# Patient Record
Sex: MALE | Race: WHITE | Employment: STUDENT | URBAN - METROPOLITAN AREA
[De-identification: names, ages, dates, MRNs, and addresses within clinical notes are randomized per-mention and may not be internally consistent; named-entity substitution may affect disease eponyms.]

---

## 2017-05-15 ENCOUNTER — OFFICE VISIT (OUTPATIENT)
Dept: FAMILY MEDICINE | Facility: CLINIC | Age: 22
End: 2017-05-15
Payer: COMMERCIAL

## 2017-05-15 ENCOUNTER — RADIANT APPOINTMENT (OUTPATIENT)
Dept: GENERAL RADIOLOGY | Facility: CLINIC | Age: 22
End: 2017-05-15
Attending: NURSE PRACTITIONER
Payer: COMMERCIAL

## 2017-05-15 VITALS
WEIGHT: 196 LBS | SYSTOLIC BLOOD PRESSURE: 128 MMHG | HEART RATE: 90 BPM | DIASTOLIC BLOOD PRESSURE: 82 MMHG | TEMPERATURE: 98.2 F | HEIGHT: 73 IN | OXYGEN SATURATION: 97 % | BODY MASS INDEX: 25.98 KG/M2

## 2017-05-15 DIAGNOSIS — S62.306A CLOSED NONDISPLACED FRACTURE OF FIFTH METACARPAL BONE OF RIGHT HAND, UNSPECIFIED PORTION OF METACARPAL, INITIAL ENCOUNTER: Primary | ICD-10-CM

## 2017-05-15 DIAGNOSIS — M79.89 SWELLING OF RIGHT HAND: ICD-10-CM

## 2017-05-15 DIAGNOSIS — F41.0 PANIC ATTACK: ICD-10-CM

## 2017-05-15 PROCEDURE — 99214 OFFICE O/P EST MOD 30 MIN: CPT | Performed by: NURSE PRACTITIONER

## 2017-05-15 PROCEDURE — 73130 X-RAY EXAM OF HAND: CPT | Mod: RT

## 2017-05-15 RX ORDER — CLONAZEPAM 0.5 MG/1
0.25-0.5 TABLET ORAL 2 TIMES DAILY PRN
Qty: 20 TABLET | Refills: 0 | Status: SHIPPED | OUTPATIENT
Start: 2017-05-15 | End: 2018-03-28

## 2017-05-15 ASSESSMENT — PATIENT HEALTH QUESTIONNAIRE - PHQ9: 5. POOR APPETITE OR OVEREATING: NEARLY EVERY DAY

## 2017-05-15 ASSESSMENT — ANXIETY QUESTIONNAIRES
6. BECOMING EASILY ANNOYED OR IRRITABLE: NOT AT ALL
2. NOT BEING ABLE TO STOP OR CONTROL WORRYING: NEARLY EVERY DAY
7. FEELING AFRAID AS IF SOMETHING AWFUL MIGHT HAPPEN: SEVERAL DAYS
GAD7 TOTAL SCORE: 14
1. FEELING NERVOUS, ANXIOUS, OR ON EDGE: NEARLY EVERY DAY
IF YOU CHECKED OFF ANY PROBLEMS ON THIS QUESTIONNAIRE, HOW DIFFICULT HAVE THESE PROBLEMS MADE IT FOR YOU TO DO YOUR WORK, TAKE CARE OF THINGS AT HOME, OR GET ALONG WITH OTHER PEOPLE: VERY DIFFICULT
5. BEING SO RESTLESS THAT IT IS HARD TO SIT STILL: SEVERAL DAYS
3. WORRYING TOO MUCH ABOUT DIFFERENT THINGS: NEARLY EVERY DAY

## 2017-05-15 NOTE — PATIENT INSTRUCTIONS
Anxiety Reaction  Anxiety is the feeling we all get when we think something bad might happen. It is a normal response to stress and usually causes only a mild reaction. When anxiety becomes more severe, it can interfere with daily life. In some cases, you may not even be aware of what it is you re anxious about. There may also be a genetic link or it may be a learned behavior in the home.  Both psychological and physical triggers cause stress reaction. It's often a response to fear or emotional stress, real or imagined. This stress may come from home, family, work, or social relationships.  During an anxiety reaction, you may feel:    Helpless    Nervous    Depressed    Irritable  Your body may show signs of anxiety in many ways. You may experience:    Dry mouth    Shakiness    Dizziness    Weakness    Trouble breathing    Breathing fast (hyperventilating)    Chest pressure    Sweating    Headache    Nausea    Diarrhea    Tiredness    Inability to sleep    Sexual problems  Home care    Try to locate the sources of stress in your life. They may not be obvious. These may include:    Daily hassles of life (traffic jams, missed appointments, car troubles, etc.)    Major life changes, both good (new baby, job promotion) and bad (loss of job, loss of loved one)    Overload: feeling that you have too many responsibilities and can't take care of all of them at once    Feeling helpless, feeling that your problems are beyond what you re able to solve    Notice how your body reacts to stress. Learn to listen to your body signals. This will help you take action before the stress becomes severe.    When you can, do something about the source of your stress. (Avoid hassles, limit the amount of change that happens in your life at one time and take a break when you feel overloaded).    Unfortunately, many stressful situations can't be avoided. It is necessary to learn how to better manage stress. There are many proven methods  that will reduce your anxiety. These include simple things like exercise, good nutrition and adequate rest. Also, there are certain techniques that are helpful:    Relaxation    Breathing exercises    Visualization    Biofeedback    Meditation  For more information about this, consult your doctor or go to a local bookstore and review the many books and tapes available on this subject.  Follow-up care  If you feel that your anxiety is not responding to self-help measures, contact your doctor or make an appointment with a counselor. You may need short-term psychological counseling and temporary medicine to help you manage stress.  Call 911  Call your healthcare provider right away if any of these occur:    Trouble breathing    Confusion    Drowsiness or trouble wakening    Fainting or loss of consciousness    Rapid heart rate    Seizure    New chest pain that becomes more severe, lasts longer, or spreads into your shoulder, arm, neck, jaw, or back  When to seek medical advice  Call your healthcare provider right away if any of these occur:    Your symptoms get worse    Severe headache not relieved by rest and mild pain reliever    0850-3541 [employer]. [address] [place] [address]. All rights reserved. This information is not intended as a substitute for professional medical care. Always follow your healthcare professional's instructions.        Boxer s Fracture  A boxer s fracture is a break in a bone in outer edge of the hand. The bone is under the pinky finger bones. Boxer s fracture gets its name because it is often caused by punching a hard surface with the fist.  Understanding the bones of the hand  The bones of your hand are called metacarpal bones. They connect the bones of your fingers (phalanges) to the bones of your wrist (carpals). The fifth metacarpal is the metacarpal of the fifth finger (pinky). A metacarpal bone has a long section of the bone (shaft) connected to the end of  the bone. The area where the shaft connects to the end of the bone is called the neck. The neck is the weakest point of the bone. This is where a boxer s fracture happens.  What causes boxer s fracture?  You may have a boxer s fracture from an injury. This most often happens from punching a hard object, such as a punching bag, wall, or other firm surface. You may also get a boxer s fracture if you fall on your closed fist.  Symptoms of boxer s fracture  Symptoms of a boxer s fracture can include:    Painful bruising and swelling of the hand    Bent, claw-like pinky finger that is out of its normal position    Limited movement (range of motion) of the ring (fourth) and pinky fingers    Change in the shape of the knuckle  Diagnosing boxer s fracture  Your health care provider will ask about your symptoms and about how you injured the hand. He or she will also examine your hand. You may have an X-ray of the hand. This uses a small amount of radiation to create an image of the bones.  Treatment for boxer s fracture  Your health care provider may refer you to a hand orthopedist. This is a doctor who treats hand problems. Your treatment may first include:    Cleaning any cuts    A tetanus vaccine, if you have cuts    Resting your hand and keeping it raised (elevated) as much as possible for a few days    Putting ice on it throughout the day    Taking prescription or over-the-counter pain medicine    Wearing a splint for several weeks  You may need to have your bones put back into position. You may have a local pain medicine to keep you from feeling pain during this process. Your health care provider will then move the bones back into place.  Surgery for boxer s fracture  You made need surgery. This is done by a hand surgeon. The surgeon makes a cut (incision) in the skin in order to put your bones back into place. You may need surgery if:    The bone has broken through the skin    The bone is broken in several  places    You use your hands and fingers for your work. For example, if you are a musician, craftsman, or .    Your hand doesn t heal normally  After surgery, you will have physical therapy to help you heal. The therapy includes treatments and exercises.  What happens if you don t get treated?  An untreated boxer s fracture can cause problems such as:    You may be less able to  objects.    You may not be able to move your hand or finger as much as you did before the injury.    Your finger may not look normal.  Preventing boxer s fracture  If you box, make sure you use the correct technique and the proper equipment.  How to manage boxer s fracture  Your health care provider may tell you to:    Keep your splint from getting wet.    Keep your bones strong and help your fracture heal. Eat foods with vitamin D, calcium, and protein.    Stop smoking. If you smoke, your fracture may not heal as quickly.    Be careful while your hand heals. You may need to use a brace for a while.     When to call the health care provider  Call your health care provider right away if you have any of these:    Numbness or tingling in your fingers    Fingers that look blue    Pain or swelling that gets worse    Problems with your splint    Skin in the area is that is red, painful, or swollen        7796-9611 [employer]. [address] [place] [address], [place] [address]. All rights reserved. This information is not intended as a substitute for professional medical care. Always follow your healthcare professional's instructions.

## 2017-05-15 NOTE — MR AVS SNAPSHOT
After Visit Summary   5/15/2017    Mario Raines    MRN: [id number]           Patient Information     Date Of Birth          1995        Visit Information        Provider Department      5/15/2017 3:20 PM Ana Langston NP [facility]        Today's Diagnoses     Swelling of right hand    -  1    Panic attack        Closed nondisplaced fracture of fifth metacarpal bone of right hand, unspecified portion of metacarpal, initial encounter          Care Instructions      Anxiety Reaction  Anxiety is the feeling we all get when we think something bad might happen. It is a normal response to stress and usually causes only a mild reaction. When anxiety becomes more severe, it can interfere with daily life. In some cases, you may not even be aware of what it is you re anxious about. There may also be a genetic link or it may be a learned behavior in the home.  Both psychological and physical triggers cause stress reaction. It's often a response to fear or emotional stress, real or imagined. This stress may come from home, family, work, or social relationships.  During an anxiety reaction, you may feel:    Helpless    Nervous    Depressed    Irritable  Your body may show signs of anxiety in many ways. You may experience:    Dry mouth    Shakiness    Dizziness    Weakness    Trouble breathing    Breathing fast (hyperventilating)    Chest pressure    Sweating    Headache    Nausea    Diarrhea    Tiredness    Inability to sleep    Sexual problems  Home care    Try to locate the sources of stress in your life. They may not be obvious. These may include:    Daily hassles of life (traffic jams, missed appointments, car troubles, etc.)    Major life changes, both good (new baby, job promotion) and bad (loss of job, loss of loved one)    Overload: feeling that you have too many responsibilities and can't take care of all of them at once    Feeling helpless, feeling that your problems are beyond what  you re able to solve    Notice how your body reacts to stress. Learn to listen to your body signals. This will help you take action before the stress becomes severe.    When you can, do something about the source of your stress. (Avoid hassles, limit the amount of change that happens in your life at one time and take a break when you feel overloaded).    Unfortunately, many stressful situations can't be avoided. It is necessary to learn how to better manage stress. There are many proven methods that will reduce your anxiety. These include simple things like exercise, good nutrition and adequate rest. Also, there are certain techniques that are helpful:    Relaxation    Breathing exercises    Visualization    Biofeedback    Meditation  For more information about this, consult your doctor or go to a local bookstore and review the many books and tapes available on this subject.  Follow-up care  If you feel that your anxiety is not responding to self-help measures, contact your doctor or make an appointment with a counselor. You may need short-term psychological counseling and temporary medicine to help you manage stress.  Call 911  Call your healthcare provider right away if any of these occur:    Trouble breathing    Confusion    Drowsiness or trouble wakening    Fainting or loss of consciousness    Rapid heart rate    Seizure    New chest pain that becomes more severe, lasts longer, or spreads into your shoulder, arm, neck, jaw, or back  When to seek medical advice  Call your healthcare provider right away if any of these occur:    Your symptoms get worse    Severe headache not relieved by rest and mild pain reliever    0142-3715 [employer]. [address] [place] [address], [place] [id number]. All rights reserved. This information is not intended as a substitute for professional medical care. Always follow your healthcare professional's instructions.        Boxer s Fracture  A boxer s fracture is a break in a  bone in outer edge of the hand. The bone is under the pinky finger bones. Boxer s fracture gets its name because it is often caused by punching a hard surface with the fist.  Understanding the bones of the hand  The bones of your hand are called metacarpal bones. They connect the bones of your fingers (phalanges) to the bones of your wrist (carpals). The fifth metacarpal is the metacarpal of the fifth finger (pinky). A metacarpal bone has a long section of the bone (shaft) connected to the end of the bone. The area where the shaft connects to the end of the bone is called the neck. The neck is the weakest point of the bone. This is where a boxer s fracture happens.  What causes boxer s fracture?  You may have a boxer s fracture from an injury. This most often happens from punching a hard object, such as a punching bag, wall, or other firm surface. You may also get a boxer s fracture if you fall on your closed fist.  Symptoms of boxer s fracture  Symptoms of a boxer s fracture can include:    Painful bruising and swelling of the hand    Bent, claw-like pinky finger that is out of its normal position    Limited movement (range of motion) of the ring (fourth) and pinky fingers    Change in the shape of the knuckle  Diagnosing boxer s fracture  Your health care provider will ask about your symptoms and about how you injured the hand. He or she will also examine your hand. You may have an X-ray of the hand. This uses a small amount of radiation to create an image of the bones.  Treatment for boxer s fracture  Your health care provider may refer you to a hand orthopedist. This is a doctor who treats hand problems. Your treatment may first include:    Cleaning any cuts    A tetanus vaccine, if you have cuts    Resting your hand and keeping it raised (elevated) as much as possible for a few days    Putting ice on it throughout the day    Taking prescription or over-the-counter pain medicine    Wearing a splint for several  weeks  You may need to have your bones put back into position. You may have a local pain medicine to keep you from feeling pain during this process. Your health care provider will then move the bones back into place.  Surgery for boxer s fracture  You made need surgery. This is done by a hand surgeon. The surgeon makes a cut (incision) in the skin in order to put your bones back into place. You may need surgery if:    The bone has broken through the skin    The bone is broken in several places    You use your hands and fingers for your work. For example, if you are a musician, craftsman, or .    Your hand doesn t heal normally  After surgery, you will have physical therapy to help you heal. The therapy includes treatments and exercises.  What happens if you don t get treated?  An untreated boxer s fracture can cause problems such as:    You may be less able to  objects.    You may not be able to move your hand or finger as much as you did before the injury.    Your finger may not look normal.  Preventing boxer s fracture  If you box, make sure you use the correct technique and the proper equipment.  How to manage boxer s fracture  Your health care provider may tell you to:    Keep your splint from getting wet.    Keep your bones strong and help your fracture heal. Eat foods with vitamin D, calcium, and protein.    Stop smoking. If you smoke, your fracture may not heal as quickly.    Be careful while your hand heals. You may need to use a brace for a while.     When to call the health care provider  Call your health care provider right away if you have any of these:    Numbness or tingling in your fingers    Fingers that look blue    Pain or swelling that gets worse    Problems with your splint    Skin in the area is that is red, painful, or swollen        2888-0783 [employer]. [address] [place] [address], [place] [address]. All rights reserved. This information is not intended as a substitute for  professional medical care. Always follow your healthcare professional's instructions.              Follow-ups after your visit        Additional Services     ORTHOPEDICS ADULT REFERRAL       Your provider has referred you to: [facility] [phone]   [email or web address]  [facility]: [facility] [phone]   [email or web address]  [facility]: [facility] [place] [phone]   [email or web address]  [facility]: [facility] [place] [phone]   [email or web address]/  [facility]: [facility] [place] [phone]   [email or web address]    Please be aware that coverage of these services is subject to the terms and limitations of your health insurance plan.  Call member services at your health plan with any benefit or coverage questions.      Please bring the following to your appointment:    >>   Any x-rays, CTs or MRIs which have been performed.  Contact the facility where they were done to arrange for  prior to your scheduled appointment.    >>   List of current medications   >>   This referral request   >>   Any documents/labs given to you for this referral                  Follow-up notes from your care team     Return if symptoms worsen or fail to improve.      Who to contact     Normal or non-critical lab and imaging results will be communicated to you by MyChart, letter or phone within 4 business days after the clinic has received the results. If you do not hear from us within 7 days, please contact the clinic through MyChart or phone. If you have a critical or abnormal lab result, we will notify you by phone as soon as possible.  Submit refill requests through [facility] or call your pharmacy and they will  "forward the refill request to us. Please allow 3 business days for your refill to be completed.          If you need to speak with a  for additional information , please call: [phone]             Additional Information About Your Visit        [facility] Information     [facility] gives you secure access to your electronic health record. If you see a primary care provider, you can also send messages to your care team and make appointments. If you have questions, please call your primary care clinic.  If you do not have a primary care provider, please call [phone] and they will assist you.        Care EveryWhere ID     This is your Care EveryWhere ID. This could be used by other organizations to access your [facility] medical records  [id number]        Your Vitals Were     Pulse Temperature Height Pulse Oximetry BMI (Body Mass Index)       90 98.2  F (36.8  C) (Oral) 6' 0.64\" (1.845 m) 97% 26.12 kg/m2        Blood Pressure from Last 3 Encounters:   05/15/17 156/78   10/23/15 122/80   10/05/15 123/70    Weight from Last 3 Encounters:   05/15/17 196 lb (88.9 kg)   10/23/15 247 lb 12.8 oz (112.4 kg) (>99 %)*   10/05/15 245 lb (111.1 kg) (>99 %)*     * Growth percentiles are based on [facility] 2-20 Years data.              We Performed the Following     DEPRESSION ACTION PLAN (DAP)     ORTHOPEDICS ADULT REFERRAL          Today's Medication Changes          These changes are accurate as of: 5/15/17  3:54 PM.  If you have any questions, ask your nurse or doctor.               Start taking these medicines.        Dose/Directions    clonazePAM 0.5 MG tablet   Commonly known as:  klonoPIN   Used for:  Panic attack   Started by:  Ana Langston NP        Dose:  0.25-0.5 mg   Take 0.5-1 tablets (0.25-0.5 mg) by mouth 2 times daily as needed for other (panic attack)   Quantity:  20 tablet   Refills:  0            Where to get your medicines      Some of these will need a paper prescription and others can be " bought over the counter.  Ask your nurse if you have questions.     Bring a paper prescription for each of these medications     clonazePAM 0.5 MG tablet                Primary Care Provider Office Phone #    Merlin Jarrett [facility] [phone]       No address on file        Thank you!     Thank you for choosing [facility]  for your care. Our goal is always to provide you with excellent care. Hearing back from our patients is one way we can continue to improve our services. Please take a few minutes to complete the written survey that you may receive in the mail after your visit with us. Thank you!             Your Updated Medication List - Protect others around you: Learn how to safely use, store and throw away your medicines at www.disposemymeds.org.          This list is accurate as of: 5/15/17  3:54 PM.  Always use your most recent med list.                   Brand Name Dispense Instructions for use    clonazePAM 0.5 MG tablet    klonoPIN    20 tablet    Take 0.5-1 tablets (0.25-0.5 mg) by mouth 2 times daily as needed for other (panic attack)

## 2017-05-15 NOTE — LETTER
My Depression Action Plan  Name: Mario Raines   Date of Birth 1995  Date: 5/15/2017    My doctor: Merlin Wood   My clinic: [facility] [place]  [address]  [place] [id number] [phone]          GREEN    ZONE   Good Control    What it looks like:     Things are going generally well. You have normal up s and down s. You may even feel depressed from time to time, but bad moods usually last less than a day.   What you need to do:  1. Continue to care for yourself (see self care plan)  2. Check your depression survival kit and update it as needed  3. Follow your physician s recommendations including any medication.  4. Do not stop taking medication unless you consult with your physician first.           YELLOW         ZONE Getting Worse    What it looks like:     Depression is starting to interfere with your life.     It may be hard to get out of bed; you may be starting to isolate yourself from others.    Symptoms of depression are starting to last most all day and this has happened for several days.     You may have suicidal thoughts but they are not constant.   What you need to do:     1. Call your care team, your response to treatment will improve if you keep your care team informed of your progress. Yellow periods are signs an adjustment may need to be made.     2. Continue your self-care, even if you have to fake it!    3. Talk to someone in your support network    4. Open up your depression survival kit           RED    ZONE Medical Alert - Get Help    What it looks like:     Depression is seriously interfering with your life.     You may experience these or other symptoms: You can t get out of bed most days, can t work or engage in other necessary activities, you have trouble taking care of basic hygiene, or basic responsibilities, thoughts of suicide or death that will not go away, self-injurious behavior.     What you need to do:  1. Call your care  team and request a same-day appointment. If they are not available (weekends or after hours) call your local crisis line, emergency room or 911.      Electronically signed by: Sharda Bui, May 15, 2017    Depression Self Care Plan / Survival Kit    Self-Care for Depression  Here s the deal. Your body and mind are really not as separate as most people think.  What you do and think affects how you feel and how you feel influences what you do and think. This means if you do things that people who feel good do, it will help you feel better.  Sometimes this is all it takes.  There is also a place for medication and therapy depending on how severe your depression is, so be sure to consult with your medical provider and/ or Behavioral Health Consultant if your symptoms are worsening or not improving.     In order to better manage my stress, I will:    Exercise  Get some form of exercise, every day. This will help reduce pain and release endorphins, the  feel good  chemicals in your brain. This is almost as good as taking antidepressants!  This is not the same as joining a gym and then never going! (they count on that by the way ) It can be as simple as just going for a walk or doing some gardening, anything that will get you moving.      Hygiene   Maintain good hygiene (Get out of bed in the morning, Make your bed, Brush your teeth, Take a shower, and Get dressed like you were going to work, even if you are unemployed).  If your clothes don't fit try to get ones that do.    Diet  I will strive to eat foods that are good for me, drink plenty of water, and avoid excessive sugar, caffeine, alcohol, and other mood-altering substances.  Some foods that are helpful in depression are: complex carbohydrates, B vitamins, flaxseed, fish or fish oil, fresh fruits and vegetables.    Psychotherapy  I agree to participate in Individual Therapy (if recommended).    Medication  If prescribed medications, I agree to take them.   Missing doses can result in serious side effects.  I understand that drinking alcohol, or other illicit drug use, may cause potential side effects.  I will not stop my medication abruptly without first discussing it with my provider.    Staying Connected With Others  I will stay in touch with my friends, family members, and my primary care provider/team.    Use your imagination  Be creative.  We all have a creative side; it doesn t matter if it s oil painting, sand castles, or mud pies! This will also kick up the endorphins.    Witness Beauty  (AKA stop and smell the roses) Take a look outside, even in mid-winter. Notice colors, textures. Watch the squirrels and birds.     Service to others  Be of service to others.  There is always someone else in need.  By helping others we can  get out of ourselves  and remember the really important things.  This also provides opportunities for practicing all the other parts of the program.    Humor  Laugh and be silly!  Adjust your TV habits for less news and crime-drama and more comedy.    Control your stress  Try breathing deep, massage therapy, biofeedback, and meditation. Find time to relax each day.     My support system    Clinic Contact:  Phone number:    Contact 1:  Phone number:    Contact 2:  Phone number:    [facility]/:  Phone number:    Therapist:  Phone number:    Local crisis center:    Phone number:    Other community support:  Phone number:

## 2017-05-15 NOTE — NURSING NOTE
"Chief Complaint   Patient presents with     Fracture       Initial /78  Pulse 90  Temp 98.2  F (36.8  C) (Oral)  Ht 6' 0.64\" (1.845 m)  Wt 196 lb (88.9 kg)  SpO2 97%  BMI 26.12 kg/m2 Estimated body mass index is 26.12 kg/(m^2) as calculated from the following:    Height as of this encounter: 6' 0.64\" (1.845 m).    Weight as of this encounter: 196 lb (88.9 kg).  Medication Reconciliation: complete     Sharda Bui MA  "

## 2017-05-15 NOTE — PROGRESS NOTES
SUBJECTIVE:                                                    Mario Raines is a 21 year old male who presents to clinic today for the following health issues:      Muscle/Joint Pain     Onset: today- punched a wall when having a panic attack     Description:   Location: right pinkie finger  Character: ache at the time    Progression of Symptoms: better    Accompanying Signs & Symptoms:  Other symptoms: feels out of place, swollen    History:   Previous similar pain: no       Precipitating factors:   Trauma or overuse: YES- punched a wall    Alleviating factors:  Improved by: nothing  Therapies Tried and outcome: ice     Anxiety- hx of anxiety/ insomnia/ panic attacks. Has been on medication in the past.     Problem list and histories reviewed & adjusted, as indicated.  Additional history: as documented    Patient Active Problem List   Diagnosis     Eczema     Acne     Fifth Metatarsal Bone Styloid/avulsion fracture     Ankle sprain     Deliberate self-cutting     Insomnia     Health Care Home     24 hour contact given to patients monther     Moderate major depression (H)     Common wart     JIMBO (obstructive sleep apnea)     Panic attack     Past Surgical History:   Procedure Laterality Date     NO HISTORY OF SURGERY         Social History   Substance Use Topics     Smoking status: Former Smoker     Types: Cigarettes     Smokeless tobacco: Never Used     Alcohol use No     Family History   Problem Relation Age of Onset     Hypertension Maternal Grandmother      Hypertension Maternal Grandfather      Family History Negative Mother      Family History Negative Father      Family History Negative Paternal Grandmother      HEART DISEASE Paternal Grandfather      heart disease     DIABETES No family hx of      CEREBROVASCULAR DISEASE No family hx of      Breast Cancer No family hx of      Cancer - colorectal No family hx of      Prostate Cancer No family hx of      C.A.D. No family hx of          Current Outpatient  "Prescriptions   Medication Sig Dispense Refill     clonazePAM (KLONOPIN) 0.5 MG tablet Take 0.5-1 tablets (0.25-0.5 mg) by mouth 2 times daily as needed for other (panic attack) 20 tablet 0     No Known Allergies  BP Readings from Last 3 Encounters:   05/15/17 128/82   10/23/15 122/80   10/05/15 123/70    Wt Readings from Last 3 Encounters:   05/15/17 196 lb (88.9 kg)   10/23/15 247 lb 12.8 oz (112.4 kg) (>99 %)*   10/05/15 245 lb (111.1 kg) (>99 %)*     * Growth percentiles are based on CDC 2-20 Years data.              Labs reviewed in EPIC    Reviewed and updated as needed this visit by clinical staff  Tobacco  Allergies  Meds  Med Hx  Surg Hx  Fam Hx  Soc Hx      Reviewed and updated as needed this visit by Provider         ROS:  Constitutional, HEENT, cardiovascular, pulmonary, GI, , musculoskeletal, neuro, skin, endocrine and psych systems are negative, except as otherwise noted.    OBJECTIVE:                                                    /82  Pulse 90  Temp 98.2  F (36.8  C) (Oral)  Ht 6' 0.64\" (1.845 m)  Wt 196 lb (88.9 kg)  SpO2 97%  BMI 26.12 kg/m2  Body mass index is 26.12 kg/(m^2).  GENERAL: healthy, alert and no distress  RESP: lungs clear to auscultation - no rales, rhonchi or wheezes  CV: regular rate and rhythm, normal S1 S2, no S3 or S4, no murmur, click or rub, no peripheral edema and peripheral pulses strong  MS: no gross musculoskeletal defects noted POSITIVE- slight edema of dorsal R hand  SKIN: no suspicious lesions or rashes  NEURO: Normal strength and tone, mentation intact and speech normal  PSYCH: mentation appears slightly anxious, affect normal/bright    Diagnostic Test Results:  See orders     ASSESSMENT/PLAN:                                                          ICD-10-CM    1. Closed nondisplaced fracture of fifth metacarpal bone of right hand, unspecified portion of metacarpal, initial encounter S62.306A ORTHOPEDICS ADULT REFERRAL   2. Swelling of right " hand M79.89 XR Hand Right G/E 3 Views   3. Panic attack F41.0 clonazePAM (KLONOPIN) 0.5 MG tablet       See Patient Instructions    SIDDHARTH Deal  [facility]

## 2017-05-16 ASSESSMENT — ANXIETY QUESTIONNAIRES: GAD7 TOTAL SCORE: 14

## 2017-05-16 ASSESSMENT — PATIENT HEALTH QUESTIONNAIRE - PHQ9: SUM OF ALL RESPONSES TO PHQ QUESTIONS 1-9: 13

## 2017-06-01 ENCOUNTER — RADIANT APPOINTMENT (OUTPATIENT)
Dept: GENERAL RADIOLOGY | Facility: CLINIC | Age: 22
End: 2017-06-01
Attending: ORTHOPAEDIC SURGERY
Payer: COMMERCIAL

## 2017-06-01 ENCOUNTER — OFFICE VISIT (OUTPATIENT)
Dept: ORTHOPEDICS | Facility: CLINIC | Age: 22
End: 2017-06-01
Payer: COMMERCIAL

## 2017-06-01 VITALS
WEIGHT: 192 LBS | HEIGHT: 72 IN | HEART RATE: 109 BPM | RESPIRATION RATE: 16 BRPM | OXYGEN SATURATION: 98 % | BODY MASS INDEX: 26.01 KG/M2

## 2017-06-01 DIAGNOSIS — S69.91XA HAND INJURY, RIGHT, INITIAL ENCOUNTER: Primary | ICD-10-CM

## 2017-06-01 DIAGNOSIS — S69.91XA HAND INJURY, RIGHT, INITIAL ENCOUNTER: ICD-10-CM

## 2017-06-01 DIAGNOSIS — S62.336A CLOSED DISPLACED FRACTURE OF NECK OF FIFTH METACARPAL BONE OF RIGHT HAND, INITIAL ENCOUNTER: ICD-10-CM

## 2017-06-01 PROCEDURE — 26605 TREAT METACARPAL FRACTURE: CPT | Mod: F9 | Performed by: ORTHOPAEDIC SURGERY

## 2017-06-01 PROCEDURE — 73130 X-RAY EXAM OF HAND: CPT | Mod: RT

## 2017-06-01 PROCEDURE — 73130 X-RAY EXAM OF HAND: CPT | Mod: 76

## 2017-06-01 ASSESSMENT — PAIN SCALES - GENERAL: PAINLEVEL: NO PAIN (0)

## 2017-06-01 NOTE — LETTER
6/1/2017       RE: Mario Raines  [address] [address]           Dear Colleague,    Thank you for referring your patient, Mario Raines, to the [facility]. Please see a copy of my visit note below.    SUBJECTIVE:  Mario Raines is a 21 year old male who sustained a right hand injury 5/15/17 . Mechanism of injury: he punched a wall. Immediate symptoms: immediate pain, immediate swelling. Symptoms have been improving since that time. Prior history of related problems: he had prior right 5th metacarpal shaft fracture about 3 years ago.  X-ray now shows right 5th metacarpal neck fracture.  He is in a lumafoam splint..    Past Medical History:   Diagnosis Date     Depression      NO ACTIVE PROBLEMS      Self mutilation        Past Surgical History:   Procedure Laterality Date     NO HISTORY OF SURGERY         Family History   Problem Relation Age of Onset     Hypertension Maternal Grandmother      Hypertension Maternal Grandfather      Family History Negative Mother      Family History Negative Father      Family History Negative Paternal Grandmother      HEART DISEASE Paternal Grandfather      heart disease     DIABETES No family hx of      CEREBROVASCULAR DISEASE No family hx of      Breast Cancer No family hx of      Cancer - colorectal No family hx of      Prostate Cancer No family hx of      C.A.D. No family hx of        Social History     Social History     Marital status: Single     Spouse name: N/A     Number of children: N/A     Years of education: N/A     Occupational History     Not on file.     Social History Main Topics     Smoking status: Former Smoker     Types: Cigarettes     Smokeless tobacco: Never Used     Alcohol use No     Drug use: No      Comment: past-marijuana and meth     Sexual activity: Yes     Partners: Female      Comment: girlfriend on OCP     Other Topics Concern     Not on file     Social History Narrative       Current Outpatient Prescriptions   Medication  Sig Dispense Refill     clonazePAM (KLONOPIN) 0.5 MG tablet Take 0.5-1 tablets (0.25-0.5 mg) by mouth 2 times daily as needed for other (panic attack) 20 tablet 0       No Known Allergies    REVIEW OF SYSTEMS:  CONSTITUTIONAL:  NEGATIVE for fever, chills, change in weight, not feeling tired  SKIN:  NEGATIVE for worrisome rashes, no skin lumps, no skin ulcers and no non-healing wounds  EYES:  NEGATIVE for vision changes or irritation.  ENT/MOUTH:  NEGATIVE.  No hearing loss, no hoarseness, no difficulty swallowing.  RESP:  NEGATIVE. No cough or shortness of breath.  BREAST:  NEGATIVE for masses, tenderness or discharge  CV:  NEGATIVE for chest pain, palpitations or peripheral edema  GI:  NEGATIVE for nausea, abdominal pain, heartburn, or change in bowel habits  :  Negative. No dysuria, no hematuria  MUSCULOSKELETAL:  See HPI above  NEURO:  NEGATIVE . No headaches, no dizziness,  no numbness  ENDOCRINE:  NEGATIVE for temperature intolerance, skin/hair changes  HEME/ALLERGY/IMMUNE:  NEGATIVE for bleeding problems  PSYCHIATRIC:  NEGATIVE. no anxiety, no depression.      Exam:  Vitals: Pulse 109  Resp 16  Ht 1.829 m (6')  Wt 87.1 kg (192 lb)  SpO2 98%  BMI 26.04 kg/m2  BMI= Body mass index is 26.04 kg/(m^2).  Constitutional:  healthy, alert and no distress  Neuro: Alert and Oriented x 3, Gait normal. Sensation grossly WNL.  Hand exam: reduced range of motion of right 5th metacarpal phalangeal joint.  There is a flexion deformity of 5th metacarpal shaft and distal.  Positive swelling about metacarpal phalangeal joint.  Sensation normal.    X-ray: fracture of right 5th metacarpal neck with flexion deformity.  Old flexion deformity of right 5th metacarpal shaft..    ASSESSMENT:  Right 5th metacarpal neck fracture.  He desires a closed reduction to improve position.    PLAN:  I performed injection with 1% lidocaine at fracture.  Closed reduction attempted very forcefully.  Splint applied.  X-ray shows slightly  improved position of fracture.  There is 8 degrees less angulation.  Continue splint.  Return to clinic 4-5 weeks with x-ray right hand out of splint.      Ramesh Mccabe M.D.  Department of Orthopaedic Surgery  [facility]    Again, thank you for allowing me to participate in the care of your patient.        Sincerely,              Ramesh Mccabe MD

## 2017-06-01 NOTE — MR AVS SNAPSHOT
After Visit Summary   6/1/2017    Mario Raines    MRN: [id number]           Patient Information     Date Of Birth          1995        Visit Information        Provider Department      6/1/2017 3:45 PM Ramesh Mccabe MD [facility]        Today's Diagnoses     Hand injury, right, initial encounter    -  1      Care Instructions    Use splint on hand.  Try to keep it dry.  retape if needed.  Return to clinic 5 weeks for x-ray.          Follow-ups after your visit        Who to contact     If you have questions or need follow up information about today's clinic visit or your schedule please contact [facility] directly at [phone].  Normal or non-critical lab and imaging results will be communicated to you by MyChart, letter or phone within 4 business days after the clinic has received the results. If you do not hear from us within 7 days, please contact the clinic through [employer] or phone. If you have a critical or abnormal lab result, we will notify you by phone as soon as possible.  Submit refill requests through [employer] or call your pharmacy and they will forward the refill request to us. Please allow 3 business days for your refill to be completed.          Additional Information About Your Visit        MyChart Information     [employer] gives you secure access to your electronic health record. If you see a primary care provider, you can also send messages to your care team and make appointments. If you have questions, please call your primary care clinic.  If you do not have a primary care provider, please call [phone] and they will assist you.        Care EveryWhere ID     This is your Care EveryWhere ID. This could be used by other organizations to access your [facility] medical records  [id number]        Your Vitals Were     Pulse Respirations Height Pulse Oximetry BMI (Body Mass Index)       109 16 1.829 m (6') 98% 26.04 kg/m2        Blood Pressure  from Last 3 Encounters:   05/15/17 128/82   10/23/15 122/80   10/05/15 123/70    Weight from Last 3 Encounters:   06/01/17 87.1 kg (192 lb)   05/15/17 88.9 kg (196 lb)   10/23/15 112.4 kg (247 lb 12.8 oz) (>99 %)*     * Growth percentiles are based on [facility] 2-20 Years data.               Primary Care Provider Office Phone #    [facility] [phone]       No address on file        Thank you!     Thank you for choosing [facility]  for your care. Our goal is always to provide you with excellent care. Hearing back from our patients is one way we can continue to improve our services. Please take a few minutes to complete the written survey that you may receive in the mail after your visit with us. Thank you!             Your Updated Medication List - Protect others around you: Learn how to safely use, store and throw away your medicines at www.disposemymeds.org.          This list is accurate as of: 6/1/17  5:10 PM.  Always use your most recent med list.                   Brand Name Dispense Instructions for use    clonazePAM 0.5 MG tablet    klonoPIN    20 tablet    Take 0.5-1 tablets (0.25-0.5 mg) by mouth 2 times daily as needed for other (panic attack)

## 2017-06-01 NOTE — NURSING NOTE
Chief Complaint   Patient presents with     Consult     patient punched a wall on 5/15/17 and went in and had xrays done that revealed a boxers fracture of the right hand       Initial Pulse 109  Resp 16  Ht 1.829 m (6')  Wt 87.1 kg (192 lb)  SpO2 98%  BMI 26.04 kg/m2 Estimated body mass index is 26.04 kg/(m^2) as calculated from the following:    Height as of this encounter: 1.829 m (6').    Weight as of this encounter: 87.1 kg (192 lb).  Medication Reconciliation: complete     Chapin Steward, CMA

## 2017-06-02 PROBLEM — S62.336A CLOSED DISPLACED FRACTURE OF NECK OF FIFTH METACARPAL BONE OF RIGHT HAND: Status: ACTIVE | Noted: 2017-06-02

## 2017-06-02 NOTE — PROGRESS NOTES
SUBJECTIVE:  Mario Raines is a 21 year old male who sustained a right hand injury 5/15/17 . Mechanism of injury: he punched a wall. Immediate symptoms: immediate pain, immediate swelling. Symptoms have been improving since that time. Prior history of related problems: he had prior right 5th metacarpal shaft fracture about 3 years ago.  X-ray now shows right 5th metacarpal neck fracture.  He is in a lumafoam splint..    Past Medical History:   Diagnosis Date     Depression      NO ACTIVE PROBLEMS      Self mutilation        Past Surgical History:   Procedure Laterality Date     NO HISTORY OF SURGERY         Family History   Problem Relation Age of Onset     Hypertension Maternal Grandmother      Hypertension Maternal Grandfather      Family History Negative Mother      Family History Negative Father      Family History Negative Paternal Grandmother      HEART DISEASE Paternal Grandfather      heart disease     DIABETES No family hx of      CEREBROVASCULAR DISEASE No family hx of      Breast Cancer No family hx of      Cancer - colorectal No family hx of      Prostate Cancer No family hx of      C.A.D. No family hx of        Social History     Social History     Marital status: Single     Spouse name: N/A     Number of children: N/A     Years of education: N/A     Occupational History     Not on file.     Social History Main Topics     Smoking status: Former Smoker     Types: Cigarettes     Smokeless tobacco: Never Used     Alcohol use No     Drug use: No      Comment: past-marijuana and meth     Sexual activity: Yes     Partners: Female      Comment: girlfriend on OCP     Other Topics Concern     Not on file     Social History Narrative       Current Outpatient Prescriptions   Medication Sig Dispense Refill     clonazePAM (KLONOPIN) 0.5 MG tablet Take 0.5-1 tablets (0.25-0.5 mg) by mouth 2 times daily as needed for other (panic attack) 20 tablet 0       No Known Allergies    REVIEW OF SYSTEMS:  CONSTITUTIONAL:   NEGATIVE for fever, chills, change in weight, not feeling tired  SKIN:  NEGATIVE for worrisome rashes, no skin lumps, no skin ulcers and no non-healing wounds  EYES:  NEGATIVE for vision changes or irritation.  ENT/MOUTH:  NEGATIVE.  No hearing loss, no hoarseness, no difficulty swallowing.  RESP:  NEGATIVE. No cough or shortness of breath.  BREAST:  NEGATIVE for masses, tenderness or discharge  CV:  NEGATIVE for chest pain, palpitations or peripheral edema  GI:  NEGATIVE for nausea, abdominal pain, heartburn, or change in bowel habits  :  Negative. No dysuria, no hematuria  MUSCULOSKELETAL:  See HPI above  NEURO:  NEGATIVE . No headaches, no dizziness,  no numbness  ENDOCRINE:  NEGATIVE for temperature intolerance, skin/hair changes  HEME/ALLERGY/IMMUNE:  NEGATIVE for bleeding problems  PSYCHIATRIC:  NEGATIVE. no anxiety, no depression.      Exam:  Vitals: Pulse 109  Resp 16  Ht 1.829 m (6')  Wt 87.1 kg (192 lb)  SpO2 98%  BMI 26.04 kg/m2  BMI= Body mass index is 26.04 kg/(m^2).  Constitutional:  healthy, alert and no distress  Neuro: Alert and Oriented x 3, Gait normal. Sensation grossly WNL.  Hand exam: reduced range of motion of right 5th metacarpal phalangeal joint.  There is a flexion deformity of 5th metacarpal shaft and distal.  Positive swelling about metacarpal phalangeal joint.  Sensation normal.    X-ray: fracture of right 5th metacarpal neck with flexion deformity.  Old flexion deformity of right 5th metacarpal shaft..    ASSESSMENT:  Right 5th metacarpal neck fracture.  He desires a closed reduction to improve position.    PLAN:  I performed injection with 1% lidocaine at fracture.  Closed reduction attempted very forcefully.  Splint applied.  X-ray shows slightly improved position of fracture.  There is 8 degrees less angulation.  Continue splint.  Return to clinic 4-5 weeks with x-ray right hand out of splint.      Ramesh Mccabe M.D.  Department of Orthopaedic Surgery  [facility]  Services

## 2017-07-06 ENCOUNTER — RADIANT APPOINTMENT (OUTPATIENT)
Dept: GENERAL RADIOLOGY | Facility: CLINIC | Age: 22
End: 2017-07-06
Attending: PHYSICIAN ASSISTANT
Payer: COMMERCIAL

## 2017-07-06 ENCOUNTER — OFFICE VISIT (OUTPATIENT)
Dept: ORTHOPEDICS | Facility: CLINIC | Age: 22
End: 2017-07-06
Payer: COMMERCIAL

## 2017-07-06 VITALS — HEIGHT: 72 IN | BODY MASS INDEX: 25.87 KG/M2 | WEIGHT: 191 LBS | RESPIRATION RATE: 18 BRPM | TEMPERATURE: 98.2 F

## 2017-07-06 DIAGNOSIS — S62.336D CLOSED DISPLACED FRACTURE OF NECK OF FIFTH METACARPAL BONE OF RIGHT HAND WITH ROUTINE HEALING, SUBSEQUENT ENCOUNTER: Primary | ICD-10-CM

## 2017-07-06 PROCEDURE — 99207 ZZC FRACTURE CARE IN GLOBAL PERIOD: CPT | Performed by: ORTHOPAEDIC SURGERY

## 2017-07-06 PROCEDURE — 73130 X-RAY EXAM OF HAND: CPT | Mod: RT

## 2017-07-06 NOTE — LETTER
7/6/2017         RE: Mario Raines  [address] [address]        Dear Colleague,    Thank you for referring your patient, Mario Raines, to the [facility]. Please see a copy of my visit note below.    Follow up right 5th metacarpal neck  fracture from 5/15/17.  Splint  is removed. He has been removing this at home as well.  Xray:  Compared to previous films there has not been significant interval changes in position; alignment remains acceptable. Progressive healing is seen.  The 5th metacarpal has increased curvature from this fracture and a previous fracture.  Exam shows no tenderness at 5th metacarpal.  He has full range of motion of the fingers.  He has been able to play the piano without problem.  Power  feels weird to him, but has good strength.    Assessment/Plan  Mario is doing well and will start range of motion on his own.  Resume activity as tolerated.  Return to clinic as needed.    Again, thank you for allowing me to participate in the care of your patient.        Sincerely,        Ramesh Mccabe MD

## 2017-07-06 NOTE — PROGRESS NOTES
Follow up right 5th metacarpal neck  fracture from 5/15/17.  Splint  is removed. He has been removing this at home as well.  Xray:  Compared to previous films there has not been significant interval changes in position; alignment remains acceptable. Progressive healing is seen.  The 5th metacarpal has increased curvature from this fracture and a previous fracture.  Exam shows no tenderness at 5th metacarpal.  He has full range of motion of the fingers.  He has been able to play the piano without problem.  Power  feels weird to him, but has good strength.    Assessment/Plan  Mario is doing well and will start range of motion on his own.  Resume activity as tolerated.  Return to clinic as needed.

## 2017-07-06 NOTE — NURSING NOTE
Chief Complaint   Patient presents with     RECHECK     Right 5th MC neck fx on 5/15/17.       Initial Temp 98.2  F (36.8  C)  Resp 18  Ht 1.829 m (6')  Wt 86.6 kg (191 lb)  BMI 25.9 kg/m2 Estimated body mass index is 25.9 kg/(m^2) as calculated from the following:    Height as of this encounter: 1.829 m (6').    Weight as of this encounter: 86.6 kg (191 lb).  Medication Reconciliation: complete   Lidia Woody MA

## 2017-07-06 NOTE — MR AVS SNAPSHOT
After Visit Summary   7/6/2017    Mario Raines    MRN: [id number]           Patient Information     Date Of Birth          1995        Visit Information        Provider Department      7/6/2017 3:15 PM Ramesh Mccabe MD [facility]        Today's Diagnoses     Closed displaced fracture of neck of fifth metacarpal bone of right hand with routine healing, subsequent encounter    -  1       Follow-ups after your visit        Who to contact     If you have questions or need follow up information about today's clinic visit or your schedule please contact [facility] directly at [phone].  Normal or non-critical lab and imaging results will be communicated to you by MyChart, letter or phone within 4 business days after the clinic has received the results. If you do not hear from us within 7 days, please contact the clinic through [employer] or phone. If you have a critical or abnormal lab result, we will notify you by phone as soon as possible.  Submit refill requests through [employer] or call your pharmacy and they will forward the refill request to us. Please allow 3 business days for your refill to be completed.          Additional Information About Your Visit        MyChart Information     [employer] gives you secure access to your electronic health record. If you see a primary care provider, you can also send messages to your care team and make appointments. If you have questions, please call your primary care clinic.  If you do not have a primary care provider, please call [phone] and they will assist you.        Care EveryWhere ID     This is your Care EveryWhere ID. This could be used by other organizations to access your [facility] medical records  [id number]        Your Vitals Were     Temperature Respirations Height BMI (Body Mass Index)          98.2  F (36.8  C) 18 1.829 m (6') 25.9 kg/m2         Blood Pressure from Last 3 Encounters:   05/15/17 128/82    10/23/15 122/80   10/05/15 123/70    Weight from Last 3 Encounters:   07/06/17 86.6 kg (191 lb)   06/01/17 87.1 kg (192 lb)   05/15/17 88.9 kg (196 lb)              We Performed the Following     X-ray rt Hand G/E 3 vws*        Primary Care Provider Office Phone #    [facility] [phone]       No address on file        Equal Access to Services     ROSLYN BURR : Hadii aad ku hadasho Soomaali, waaxda luqadaha, qaybta kaalmada adeegyada, waxay idiin hayaan adeeg ikedavidaye villafuerte . So [facility] [phone].    ATENCIÓN: Si yoly farfan, tiene a montoya disposición servicios gratuitos de asistencia lingüística. Llame al [phone].    We comply with applicable federal civil rights laws and [place] laws. We do not discriminate on the basis of race, color, national origin, age, disability sex, sexual orientation or gender identity.            Thank you!     Thank you for choosing [facility]  for your care. Our goal is always to provide you with excellent care. Hearing back from our patients is one way we can continue to improve our services. Please take a few minutes to complete the written survey that you may receive in the mail after your visit with us. Thank you!             Your Updated Medication List - Protect others around you: Learn how to safely use, store and throw away your medicines at www.disposemymeds.org.          This list is accurate as of: 7/6/17  3:52 PM.  Always use your most recent med list.                   Brand Name Dispense Instructions for use Diagnosis    clonazePAM 0.5 MG tablet    klonoPIN    20 tablet    Take 0.5-1 tablets (0.25-0.5 mg) by mouth 2 times daily as needed for other (panic attack)    Panic attack

## 2017-10-24 ENCOUNTER — TELEPHONE (OUTPATIENT)
Dept: FAMILY MEDICINE | Facility: CLINIC | Age: 22
End: 2017-10-24

## 2017-10-24 NOTE — TELEPHONE ENCOUNTER
Phone visit scheduled in Error.  Patient needs to come in for visit, can be fit in today's schedule with Dr. Rushing.   Clare Avery RN

## 2017-10-24 NOTE — TELEPHONE ENCOUNTER
Left generic message with patient mother cell for patient to come in for visit and that this cannot be a phone visit, only numbers listed are for family.   Clare Avery RN

## 2017-10-25 ENCOUNTER — OFFICE VISIT (OUTPATIENT)
Dept: FAMILY MEDICINE | Facility: CLINIC | Age: 22
End: 2017-10-25
Payer: COMMERCIAL

## 2017-10-25 VITALS
OXYGEN SATURATION: 98 % | TEMPERATURE: 99.9 F | BODY MASS INDEX: 27.94 KG/M2 | HEART RATE: 92 BPM | SYSTOLIC BLOOD PRESSURE: 121 MMHG | DIASTOLIC BLOOD PRESSURE: 66 MMHG | WEIGHT: 206 LBS | RESPIRATION RATE: 15 BRPM

## 2017-10-25 DIAGNOSIS — J02.9 SORETHROAT: Primary | ICD-10-CM

## 2017-10-25 DIAGNOSIS — J02.0 STREP THROAT: ICD-10-CM

## 2017-10-25 LAB
DEPRECATED S PYO AG THROAT QL EIA: ABNORMAL
SPECIMEN SOURCE: ABNORMAL

## 2017-10-25 PROCEDURE — 87880 STREP A ASSAY W/OPTIC: CPT | Performed by: FAMILY MEDICINE

## 2017-10-25 PROCEDURE — 99213 OFFICE O/P EST LOW 20 MIN: CPT | Performed by: FAMILY MEDICINE

## 2017-10-25 RX ORDER — AMOXICILLIN 875 MG
875 TABLET ORAL 2 TIMES DAILY
Qty: 20 TABLET | Refills: 0 | Status: SHIPPED | OUTPATIENT
Start: 2017-10-25 | End: 2017-11-04

## 2017-10-25 ASSESSMENT — PATIENT HEALTH QUESTIONNAIRE - PHQ9: SUM OF ALL RESPONSES TO PHQ QUESTIONS 1-9: 8

## 2017-10-25 NOTE — MR AVS SNAPSHOT
After Visit Summary   10/25/2017    Mario Raines    MRN: [id number]           Patient Information     Date Of Birth          1995        Visit Information        Provider Department      10/25/2017 3:00 PM Olamide Middleton MD [facility]        Today's Diagnoses     Sorethroat    -  1    Strep throat           Follow-ups after your visit        Who to contact     If you have questions or need follow up information about today's clinic visit or your schedule please contact [facility] directly at [phone].  Normal or non-critical lab and imaging results will be communicated to you by [employer], letter or phone within 4 business days after the clinic has received the results. If you do not hear from us within 7 days, please contact the clinic through [employer] or phone. If you have a critical or abnormal lab result, we will notify you by phone as soon as possible.  Submit refill requests through [employer] or call your pharmacy and they will forward the refill request to us. Please allow 3 business days for your refill to be completed.          Additional Information About Your Visit        MyChart Information     [employer] gives you secure access to your electronic health record. If you see a primary care provider, you can also send messages to your care team and make appointments. If you have questions, please call your primary care clinic.  If you do not have a primary care provider, please call [phone] and they will assist you.        Care EveryWhere ID     This is your Care EveryWhere ID. This could be used by other organizations to access your [facility] medical records  [id number]        Your Vitals Were     Pulse Temperature Respirations Pulse Oximetry BMI (Body Mass Index)       92 99.9  F (37.7  C) (Oral) 15 98% 27.94 kg/m2        Blood Pressure from Last 3 Encounters:   10/25/17 121/66   05/15/17 128/82   10/23/15 122/80    Weight from Last 3 Encounters:    10/25/17 206 lb (93.4 kg)   07/06/17 191 lb (86.6 kg)   06/01/17 192 lb (87.1 kg)              We Performed the Following     Strep, Rapid Screen          Today's Medication Changes          These changes are accurate as of: 10/25/17  4:40 PM.  If you have any questions, ask your nurse or doctor.               Start taking these medicines.        Dose/Directions    amoxicillin 875 MG tablet   Commonly known as:  AMOXIL   Used for:  Strep throat   Started by:  Olamide Middleton MD        Dose:  875 mg   Take 1 tablet (875 mg) by mouth 2 times daily for 10 days   Quantity:  20 tablet   Refills:  0            Where to get your medicines      These medications were sent to [facility] [address] [facility], [facility] [address]  [address] [address] [address]     Phone:  [phone]     amoxicillin 875 MG tablet                Primary Care Provider Office Phone # Fax #    [facility] [phone] [address] [facility] [address]        Equal Access to Services     [facility] Center[facility]EL : Hadii aad ku hadasho Soomaali, waaxda luqadaha, qaybta kaalmada adeegyada, vanessa villafuerte . So [facility] [phone].    ATENCIÓN: Si habla español, tiene a montoya disposición servicios gratuitos de asistencia lingüística. Llame al [phone].    We comply with applicable federal civil rights laws and [place] laws. We do not discriminate on the basis of race, color, national origin, age, disability, sex, sexual orientation, or gender identity.            Thank you!     Thank you for choosing [facility]  for your care. Our goal is always to provide you with excellent care. Hearing back from our patients is one way we can continue to improve our services. Please take a few minutes to complete the written survey that you may receive in the mail after your visit with us. Thank you!             Your Updated Medication List - Protect  others around you: Learn how to safely use, store and throw away your medicines at www.disposemymeds.org.          This list is accurate as of: 10/25/17  4:40 PM.  Always use your most recent med list.                   Brand Name Dispense Instructions for use Diagnosis    amoxicillin 875 MG tablet    AMOXIL    20 tablet    Take 1 tablet (875 mg) by mouth 2 times daily for 10 days    Strep throat       clonazePAM 0.5 MG tablet    klonoPIN    20 tablet    Take 0.5-1 tablets (0.25-0.5 mg) by mouth 2 times daily as needed for other (panic attack)    Panic attack

## 2017-10-25 NOTE — NURSING NOTE
Chief Complaint   Patient presents with     Pharyngitis     sore throat, headache and fatigue x 4 days       Initial /66  Pulse 92  Temp 99.9  F (37.7  C) (Oral)  Resp 15  Wt 206 lb (93.4 kg)  SpO2 98%  BMI 27.94 kg/m2 Estimated body mass index is 27.94 kg/(m^2) as calculated from the following:    Height as of 7/6/17: 6' (1.829 m).    Weight as of this encounter: 206 lb (93.4 kg).  Medication Reconciliation: luciana Reed MA

## 2017-10-25 NOTE — PROGRESS NOTES
SUBJECTIVE:   Mario Raines is a 22 year old male who presents to clinic today for the following health issues:      RESPIRATORY SYMPTOMS      Duration: 4 days    Description  nasal congestion, rhinorrhea, sore throat, cough, headache and fatigue/malaise    Severity: moderate    Accompanying signs and symptoms: None    History (predisposing factors):  tnone    Precipitating or alleviating factors: None    Therapies tried and outcome:  oral decongestant    No thoughts of harming self or others     Woke up throat was a little sore but the next day got worse  Also been having chills.   Patient took some ibuprofen and dayquil today.   able to swallow liquids and solids -YES  other symptoms above  Rash: No  Has tried over the counter medications no relief  because of persistence, patient came in to be seen.    ROS:  denies any exertional chest pain or shortness of breath  denies any unusual rash or joint swelling  denies post-tussive emesis or pertussis like symptoms  Negative for constitutional, eye, ear, nose, throat, skin, respiratory, cardiac, and gastrointestinal other than those outlined in the HPI.    PMH: chart reviewed  FH: chart reviewed    SH: chart reviewed and as above   Physical Exam:   /66  Pulse 92  Temp 99.9  F (37.7  C) (Oral)  Resp 15  Wt 206 lb (93.4 kg)  SpO2 98%  BMI 27.94 kg/m2  General : Awake Alert not in any acute cardiorespiratory distress  Head:       Normocephalic Atraumatic  Eyes:    Pupils equally reactive to light and accomodation. Sclera not icteric.   ENT:   midline nasal septum, mild nasal congestion, sinuses non-tender  left ear: no tragal tenderness, no mastoid tenderness, normal EAC, normal TM  right ear: left ear: no tragal tenderness, no mastoid tenderness, normal EAC, normal TM  mouth moist buccal mucosa, Yes hyperemic posterior pharyngeal wall, no trismus  tonsils: bilateral tonsil abnormal with erythematous grade 2 with exudate  anterior cervical nodes: Yes  tender  posterior cervical nodes: No  palpable  Heart:  Regular in rate and rhythm, no murmurs rubs or gallops  Lungs: Symmetrical Chest Expansion, no retractions, clear breath sounds  Abdomen: soft, no hepatosplenomegally  Psych: Appropriate mood and affect. Pleasant  Skin: patient undressed to level of his/her comfort. No visible concerning lesions.    Labs: Strep positive     ICD-10-CM    1. Sorethroat J02.9 Strep, Rapid Screen   2. Strep throat J02.0 amoxicillin (AMOXIL) 875 MG tablet     Prescribed with amoxicillin   supportive treatment: advised supportive treatment, Advised to come back in if with any worsening symptoms or if not better despite supportive measures. Especially if with any worsening sore throat, inability to eat or drink or swallow, or trismus. Symptoms of peritonsillar abscess discussed. Patient voiced understanding.  Discussed mono testing and patient declined aware of risks of mono, may break out in a rash if on amoxicillin, and risk of hepatosplenomegaly and rupture with mono. Signs and symptoms of mono discussed  adverse reactions of medication discussed  OTC medications discussed  advised to come back in right away if with any worsening symptoms or if with no relief despite treatment plan  patient voiced understanding and had no further questions at this time.

## 2018-03-28 ENCOUNTER — OFFICE VISIT (OUTPATIENT)
Dept: FAMILY MEDICINE | Facility: CLINIC | Age: 23
End: 2018-03-28
Payer: COMMERCIAL

## 2018-03-28 VITALS
DIASTOLIC BLOOD PRESSURE: 86 MMHG | HEIGHT: 72 IN | HEART RATE: 82 BPM | BODY MASS INDEX: 28.15 KG/M2 | TEMPERATURE: 98.5 F | WEIGHT: 207.8 LBS | SYSTOLIC BLOOD PRESSURE: 138 MMHG

## 2018-03-28 DIAGNOSIS — F19.11 HISTORY OF DRUG ABUSE IN REMISSION (H): ICD-10-CM

## 2018-03-28 DIAGNOSIS — F41.1 GENERALIZED ANXIETY DISORDER: Primary | ICD-10-CM

## 2018-03-28 DIAGNOSIS — F32.1 MODERATE MAJOR DEPRESSION (H): ICD-10-CM

## 2018-03-28 LAB
ERYTHROCYTE [DISTWIDTH] IN BLOOD BY AUTOMATED COUNT: 12.5 % (ref 10–15)
HCT VFR BLD AUTO: 43.3 % (ref 40–53)
HGB BLD-MCNC: 15.3 G/DL (ref 13.3–17.7)
MCH RBC QN AUTO: 31.4 PG (ref 26.5–33)
MCHC RBC AUTO-ENTMCNC: 35.3 G/DL (ref 31.5–36.5)
MCV RBC AUTO: 89 FL (ref 78–100)
PLATELET # BLD AUTO: 308 10E9/L (ref 150–450)
RBC # BLD AUTO: 4.87 10E12/L (ref 4.4–5.9)
WBC # BLD AUTO: 5.4 10E9/L (ref 4–11)

## 2018-03-28 PROCEDURE — 85027 COMPLETE CBC AUTOMATED: CPT | Performed by: FAMILY MEDICINE

## 2018-03-28 PROCEDURE — 82306 VITAMIN D 25 HYDROXY: CPT | Performed by: FAMILY MEDICINE

## 2018-03-28 PROCEDURE — 40000358 ZZHCL STATISTIC DRUG SCREEN MULTIPLE (METRO): Performed by: FAMILY MEDICINE

## 2018-03-28 PROCEDURE — 84443 ASSAY THYROID STIM HORMONE: CPT | Performed by: FAMILY MEDICINE

## 2018-03-28 PROCEDURE — 99214 OFFICE O/P EST MOD 30 MIN: CPT | Performed by: FAMILY MEDICINE

## 2018-03-28 PROCEDURE — 80307 DRUG TEST PRSMV CHEM ANLYZR: CPT | Performed by: FAMILY MEDICINE

## 2018-03-28 PROCEDURE — 82607 VITAMIN B-12: CPT | Performed by: FAMILY MEDICINE

## 2018-03-28 PROCEDURE — 80053 COMPREHEN METABOLIC PANEL: CPT | Performed by: FAMILY MEDICINE

## 2018-03-28 PROCEDURE — 36415 COLL VENOUS BLD VENIPUNCTURE: CPT | Performed by: FAMILY MEDICINE

## 2018-03-28 RX ORDER — ESCITALOPRAM OXALATE 10 MG/1
10 TABLET ORAL DAILY
Qty: 30 TABLET | Refills: 1 | Status: SHIPPED | OUTPATIENT
Start: 2018-03-28 | End: 2018-04-25

## 2018-03-28 ASSESSMENT — ANXIETY QUESTIONNAIRES
7. FEELING AFRAID AS IF SOMETHING AWFUL MIGHT HAPPEN: NEARLY EVERY DAY
3. WORRYING TOO MUCH ABOUT DIFFERENT THINGS: NEARLY EVERY DAY
5. BEING SO RESTLESS THAT IT IS HARD TO SIT STILL: MORE THAN HALF THE DAYS
6. BECOMING EASILY ANNOYED OR IRRITABLE: NOT AT ALL
IF YOU CHECKED OFF ANY PROBLEMS ON THIS QUESTIONNAIRE, HOW DIFFICULT HAVE THESE PROBLEMS MADE IT FOR YOU TO DO YOUR WORK, TAKE CARE OF THINGS AT HOME, OR GET ALONG WITH OTHER PEOPLE: EXTREMELY DIFFICULT
2. NOT BEING ABLE TO STOP OR CONTROL WORRYING: NEARLY EVERY DAY
1. FEELING NERVOUS, ANXIOUS, OR ON EDGE: NEARLY EVERY DAY
GAD7 TOTAL SCORE: 17

## 2018-03-28 ASSESSMENT — PATIENT HEALTH QUESTIONNAIRE - PHQ9: 5. POOR APPETITE OR OVEREATING: NEARLY EVERY DAY

## 2018-03-28 NOTE — MR AVS SNAPSHOT
After Visit Summary   3/28/2018    Mario Raines    MRN: [id number]           Patient Information     Date Of Birth          1995        Visit Information        Provider Department      3/28/2018 4:30 PM Aziza Caal MD [facility]        Today's Diagnoses     Generalized anxiety disorder    -  1    Moderate major depression (H)        History of drug abuse in remission          Care Instructions      Anxiety Reaction  Anxiety is the feeling we all get when we think something bad might happen. It is a normal response to stress and usually causes only a mild reaction. When anxiety becomes more severe, it can interfere with daily life. In some cases, you may not even be aware of what it is you re anxious about. There may also be a genetic link or it may be a learned behavior in the home.  Both psychological and physical triggers cause stress reaction. It's often a response to fear or emotional stress, real or imagined. This stress may come from home, family, work, or social relationships.  During an anxiety reaction, you may feel:    Helpless    Nervous    Depressed    Irritable  Your body may show signs of anxiety in many ways. You may experience:    Dry mouth    Shakiness    Dizziness    Weakness    Trouble breathing    Breathing fast (hyperventilating)    Chest pressure    Sweating    Headache    Nausea    Diarrhea    Tiredness    Inability to sleep    Sexual problems  Home care    Try to locate the sources of stress in your life. They may not be obvious. These may include:    Daily hassles of life (traffic jams, missed appointments, car troubles, etc.)    Major life changes, both good (new baby, job promotion) and bad (loss of job, loss of loved one)    Overload: feeling that you have too many responsibilities and can't take care of all of them at once    Feeling helpless, feeling that your problems are beyond what you re able to solve    Notice how your body reacts to stress.  Learn to listen to your body signals. This will help you take action before the stress becomes severe.    When you can, do something about the source of your stress. (Avoid hassles, limit the amount of change that happens in your life at one time and take a break when you feel overloaded).    Unfortunately, many stressful situations can't be avoided. It is necessary to learn how to better manage stress. There are many proven methods that will reduce your anxiety. These include simple things like exercise, good nutrition and adequate rest. Also, there are certain techniques that are helpful:    Relaxation    Breathing exercises    Visualization    Biofeedback    Meditation  For more information about this, consult your doctor or go to a local bookstore and review the many books and tapes available on this subject.  Follow-up care  If you feel that your anxiety is not responding to self-help measures, contact your doctor or make an appointment with a counselor. You may need short-term psychological counseling and temporary medicine to help you manage stress.  Call 911  Call your healthcare provider right away if any of these occur:    Trouble breathing    Confusion    Drowsiness or trouble wakening    Fainting or loss of consciousness    Rapid heart rate    Seizure    New chest pain that becomes more severe, lasts longer, or spreads into your shoulder, arm, neck, jaw, or back  When to seek medical advice  Call your healthcare provider right away if any of these occur:    Your symptoms get worse    Severe headache not relieved by rest and mild pain reliever  Date Last Reviewed: 9/29/2015 2000-2017 [email or web address]. [address] [place] [address], [place] [address]. All rights reserved. This information is not intended as a substitute for professional medical care. Always follow your healthcare professional's instructions.                Follow-ups after your visit        Additional Services     MENTAL HEALTH  REFERRAL  - Adult; Outpatient Treatment; Individual/Couples/Family/Group Therapy/Health Psychology; [facility]: [facility] [phone]; We will contact you to schedule the appointment or please call with any questions       All scheduling is subject to the client's specific insurance plan & benefits, provider/location availability, and provider clinical specialities.  Please arrive 15 minutes early for your first appointment and bring your completed paperwork.    Please be aware that coverage of these services is subject to the terms and limitations of your health insurance plan.  Call member services at your health plan with any benefit or coverage questions.                            Follow-up notes from your care team     Return in about 1 month (around 4/28/2018) for Follow up.      Who to contact     Normal or non-critical lab and imaging results will be communicated to you by [employer], letter or phone within 4 business days after the clinic has received the results. If you do not hear from us within 7 days, please contact the clinic through [employer] or phone. If you have a critical or abnormal lab result, we will notify you by phone as soon as possible.  Submit refill requests through [employer] or call your pharmacy and they will forward the refill request to us. Please allow 3 business days for your refill to be completed.          If you need to speak with a  for additional information , please call: [phone]             Additional Information About Your Visit        [employer] Information     [employer] gives you secure access to your electronic health record. If you see a primary care provider, you can also send messages to your care team and make appointments. If you have questions, please call your primary care clinic.  If you do not have a primary care provider, please call [phone] and they will assist you.        Care EveryWhere ID     This is your Care EveryWhere ID. This  could be used by other organizations to access your [facility] medical records  [id number]        Your Vitals Were     Pulse Temperature Height BMI (Body Mass Index)          82 98.5  F (36.9  C) (Tympanic) 6' (1.829 m) 28.18 kg/m2         Blood Pressure from Last 3 Encounters:   03/28/18 138/86   10/25/17 121/66   05/15/17 128/82    Weight from Last 3 Encounters:   03/28/18 207 lb 12.8 oz (94.3 kg)   10/25/17 206 lb (93.4 kg)   07/06/17 191 lb (86.6 kg)              We Performed the Following     CBC with platelets     Comprehensive metabolic panel     Drug screen urine     MENTAL HEALTH REFERRAL  - Adult; Outpatient Treatment; Individual/Couples/Family/Group Therapy/Health Psychology; [facility]: [facility] [phone]; We will contact you to schedule the appointment or please call with any questions     TSH with free T4 reflex     Vitamin B12     Vitamin D Deficiency          Today's Medication Changes          These changes are accurate as of 3/28/18  5:06 PM.  If you have any questions, ask your nurse or doctor.               Start taking these medicines.        Dose/Directions    escitalopram 10 MG tablet   Commonly known as:  LEXAPRO   Used for:  Generalized anxiety disorder, Moderate major depression (H)   Started by:  Aziza Caal MD        Dose:  10 mg   Take 1 tablet (10 mg) by mouth daily   Quantity:  30 tablet   Refills:  1            Where to get your medicines      These medications were sent to [facility] - [address] [facility]  [address] [facility] [address]     Phone:  [phone]     escitalopram 10 MG tablet                Primary Care Provider Office Phone # Fax #    [facility] [phone] [address] [facility] [address]        Equal Access to Services     ROSLYN BURR AH: Misty Marr, ye garcia, qaybta kaalshanda blair, vanessa le. So  [facility] [phone].    ATENCIÓN: Si contrerasla naheed, tiene a montoya disposición servicios gratuitos de asistencia lingüística. Stephania hanna [phone].    We comply with applicable federal civil rights laws and [place] laws. We do not discriminate on the basis of race, color, national origin, age, disability, sex, sexual orientation, or gender identity.            Thank you!     Thank you for choosing [facility]  for your care. Our goal is always to provide you with excellent care. Hearing back from our patients is one way we can continue to improve our services. Please take a few minutes to complete the written survey that you may receive in the mail after your visit with us. Thank you!             Your Updated Medication List - Protect others around you: Learn how to safely use, store and throw away your medicines at www.disposemymeds.org.          This list is accurate as of 3/28/18  5:06 PM.  Always use your most recent med list.                   Brand Name Dispense Instructions for use Diagnosis    escitalopram 10 MG tablet    LEXAPRO    30 tablet    Take 1 tablet (10 mg) by mouth daily    Generalized anxiety disorder, Moderate major depression (H)

## 2018-03-28 NOTE — PATIENT INSTRUCTIONS

## 2018-03-28 NOTE — PROGRESS NOTES
SUBJECTIVE:   Mario Raines is a 22 year old male who presents to clinic today for the following health issues:      Abnormal Mood Symptoms  Onset: x1 month    Description:   Depression: YES- ongoing  Anxiety: YES    Accompanying Signs & Symptoms:  Still participating in activities that you used to enjoy: no  Fatigue: no  Irritability: no  Difficulty concentrating: YES  Changes in appetite: no  Problems with sleep: YES- trouble falling asleep- will take about x3-4 hours  Heart racing/beating fast : no  Thoughts of hurting yourself or others: none    History:   Recent stress: no  Prior depression hospitalization: YES  Family history of depression: no  Family history of anxiety: no    Precipitating factors:   Alcohol/drug use: Past use; has been sober for x2 years from drugs and alcohol     Alleviating factors:  none    Therapies Tried and outcome: unsure what he has tried in the past and what has worked; has been of medication for about x 2 years.     Dropped out of High school. Currently not going to school or working. Lives with his parents.   History of drug abuse 3 years ago. Denies recent use.     PHQ-9 ([employer]) 3/28/2018   1.  Little interest or pleasure in doing things 1   2.  Feeling down, depressed, or hopeless 2   3.  Trouble falling or staying asleep, or sleeping too much 3   4.  Feeling tired or having little energy 1   5.  Poor appetite or overeating 0   6.  Feeling bad about yourself 2   7.  Trouble concentrating 2   8.  Moving slowly or restless 3   9.  Suicidal or self-harm thoughts 0   PHQ-9 Total Score 14   Difficulty at work, home, or with people Extremely dIfficult     ROBERTO-7   [employer], 2002; Used with Permission) 3/28/2018   ROBERTO-7 Total Score =     1. Feeling nervous, anxious, or on edge 3   2. Not being able to stop or control worrying 3   3. Worrying too much about different things 3   4. Trouble relaxing 3   5. Being so restless that it is hard to sit still 2   6. Becoming easily annoyed or  irritable 0   7. Feeling afraid, as if something awful might happen 3   ROBERTO-7 Total Score 17   If you checked any problems, how difficult have they made it for you to do your work, take care of things at home, or get along with other people? Extremely difficult       Problem list and histories reviewed & adjusted, as indicated.  Additional history: as documented    Patient Active Problem List   Diagnosis     Eczema     Acne     Fifth Metatarsal Bone Styloid/avulsion fracture     Ankle sprain     Deliberate self-cutting     Insomnia     Health Care Home     24 hour contact given to patients monther     Moderate major depression (H)     Common wart     JIMBO (obstructive sleep apnea)     Panic attack     Closed displaced fracture of neck of fifth metacarpal bone of right hand     Past Surgical History:   Procedure Laterality Date     NO HISTORY OF SURGERY         Social History   Substance Use Topics     Smoking status: Former Smoker     Types: Cigarettes     Smokeless tobacco: Never Used     Alcohol use No     Family History   Problem Relation Age of Onset     Hypertension Maternal Grandmother      Hypertension Maternal Grandfather      Family History Negative Mother      Family History Negative Father      Family History Negative Paternal Grandmother      HEART DISEASE Paternal Grandfather      heart disease     DIABETES No family hx of      CEREBROVASCULAR DISEASE No family hx of      Breast Cancer No family hx of      Cancer - colorectal No family hx of      Prostate Cancer No family hx of      C.A.D. No family hx of          No current outpatient prescriptions on file.     No Known Allergies    Reviewed and updated as needed this visit by clinical staff       Reviewed and updated as needed this visit by Provider         ROS:  Constitutional, HEENT, cardiovascular, pulmonary, gi and gu systems are negative, except as otherwise noted.    OBJECTIVE:     /86  Pulse 82  Temp 98.5  F (36.9  C) (Tympanic)  Ht 6'  (1.829 m)  Wt 207 lb 12.8 oz (94.3 kg)  BMI 28.18 kg/m2  Body mass index is 28.18 kg/(m^2).  GENERAL: healthy, alert and no distress  PSYCH: Flat affect. Mood and affect are appropriate. judgment and insight are appropriate. Mentation appears normal, affect normal/bright    Diagnostic Test Results:  Labs in process     ASSESSMENT/PLAN:     Mario was seen today for depression and anxiety.    Diagnoses and all orders for this visit:    Generalized anxiety disorder  -     PHQ-9/ROBERTO 7 completed, see above for details   -     CBC with platelets  -     Comprehensive metabolic panel  -     TSH with free T4 reflex  -     Vitamin D Deficiency  -     Vitamin B12  -     Start an SSRI: escitalopram (LEXAPRO) 10 MG tablet; Take 1 tablet (10 mg) by mouth daily  -     MENTAL HEALTH REFERRAL  - Adult; Outpatient Treatment; Individual/Couples/Family/Group Therapy/Health Psychology; [facility]: [facility] [phone]; We will contact you to schedule the appointment or please call with any questions    Moderate major depression (H)  -     PHQ-9/ROBERTO 7 completed, see above for details   -     CBC with platelets  -     Comprehensive metabolic panel  -     TSH with free T4 reflex  -     Vitamin D Deficiency  -     Vitamin B12  -     Start and SSRI: escitalopram (LEXAPRO) 10 MG tablet; Take 1 tablet (10 mg) by mouth daily  -     MENTAL HEALTH REFERRAL  - Adult; Outpatient Treatment; Individual/Couples/Family/Group Therapy/Health Psychology; [facility]: [facility] [phone]; We will contact you to schedule the appointment or please call with any questions    History of drug abuse in remission  -     Drug screen urine      Follow up in a month, sooner if needed.     Aziza Caal MD  [facility]

## 2018-03-29 LAB
ALBUMIN SERPL-MCNC: 4.5 G/DL (ref 3.4–5)
ALP SERPL-CCNC: 79 U/L (ref 40–150)
ALT SERPL W P-5'-P-CCNC: 19 U/L (ref 0–70)
ANION GAP SERPL CALCULATED.3IONS-SCNC: 9 MMOL/L (ref 3–14)
AST SERPL W P-5'-P-CCNC: 16 U/L (ref 0–45)
BILIRUB SERPL-MCNC: 0.4 MG/DL (ref 0.2–1.3)
BUN SERPL-MCNC: 6 MG/DL (ref 7–30)
CALCIUM SERPL-MCNC: 9.4 MG/DL (ref 8.5–10.1)
CHLORIDE SERPL-SCNC: 103 MMOL/L (ref 94–109)
CO2 SERPL-SCNC: 26 MMOL/L (ref 20–32)
CREAT SERPL-MCNC: 0.92 MG/DL (ref 0.66–1.25)
DEPRECATED CALCIDIOL+CALCIFEROL SERPL-MC: 14 UG/L (ref 20–75)
GFR SERPL CREATININE-BSD FRML MDRD: >90 ML/MIN/1.7M2
GLUCOSE SERPL-MCNC: 91 MG/DL (ref 70–99)
POTASSIUM SERPL-SCNC: 4 MMOL/L (ref 3.4–5.3)
PROT SERPL-MCNC: 8.3 G/DL (ref 6.8–8.8)
SODIUM SERPL-SCNC: 138 MMOL/L (ref 133–144)
TSH SERPL DL<=0.005 MIU/L-ACNC: 1.11 MU/L (ref 0.4–4)
VIT B12 SERPL-MCNC: 560 PG/ML (ref 193–986)

## 2018-03-29 ASSESSMENT — ANXIETY QUESTIONNAIRES: GAD7 TOTAL SCORE: 17

## 2018-03-29 ASSESSMENT — PATIENT HEALTH QUESTIONNAIRE - PHQ9: SUM OF ALL RESPONSES TO PHQ QUESTIONS 1-9: 14

## 2018-03-30 LAB
ACETAMINOPHEN QUAL: NEGATIVE
AMANTADINE: NEGATIVE
AMITRIPTYLINE QUAL: NEGATIVE
AMOXAPINE: NEGATIVE
AMPHETAMINES QUAL: NEGATIVE
ATROPINE: NEGATIVE
BENZODIAZ UR QL: NEGATIVE
CAFFEINE QUAL: POSITIVE
CANNABINOIDS UR QL SCN: NEGATIVE
CARBAMAZEPINE QUAL: NEGATIVE
CHLORPHENIRAMINE: NEGATIVE
CHLORPROMAZINE: NEGATIVE
CITALOPRAM QUAL: NEGATIVE
CLOMIPRAMINE QUAL: NEGATIVE
COCAINE QUAL: NEGATIVE
COCAINE UR QL: NEGATIVE
CODEINE QUAL: NEGATIVE
DESIPRAMINE QUAL: NEGATIVE
DEXTROMETHORPHAN: NEGATIVE
DIPHENHYDRAMINE: NEGATIVE
DOXEPIN/METABOLITE: NEGATIVE
DOXYLAMINE: NEGATIVE
EPHEDRINE OR PSEUDO: NEGATIVE
FENTANYL QUAL: NEGATIVE
FLUOXETINE AND METAB: NEGATIVE
HYDROCODONE QUAL: NEGATIVE
HYDROMORPHONE QUAL: NEGATIVE
IBUPROFEN QUAL: NEGATIVE
IMIPRAMINE QUAL: NEGATIVE
LAMOTRIGINE QUAL: NEGATIVE
LOXAPINE: NEGATIVE
MAPROTYLINE: NEGATIVE
MDMA QUAL: NEGATIVE
MEPERIDINE QUAL: NEGATIVE
METHAMPHETAMINE: NEGATIVE
METHODONE QUAL: NEGATIVE
MORPHINE QUAL: NEGATIVE
NICOTINE: NEGATIVE
NORTRIPTYLINE QUAL: NEGATIVE
OLANZAPINE QUAL: NEGATIVE
OPIATES UR QL SCN: NEGATIVE
OXYCODONE QUAL: NEGATIVE
PENTAZOCINE: NEGATIVE
PHENCYCLIDINE QUAL: NEGATIVE
PHENMETRAZINE: NEGATIVE
PHENTERMINE: NEGATIVE
PHENYLBUTAZONE: NEGATIVE
PHENYLPROPANOLAMINE: NEGATIVE
PROPOXPHENE QUAL: NEGATIVE
PROPRANOLOL QUAL: NEGATIVE
PYRILAMINE: NEGATIVE
SALICYLATE QUAL: NEGATIVE
THEOBROMINE: POSITIVE
TOPIRAMATE QUAL: NEGATIVE
TRIMIPRAMINE QUAL: NEGATIVE
VENLAFAXINE QUAL: NEGATIVE

## 2018-04-03 DIAGNOSIS — E55.9 VITAMIN D DEFICIENCY: Primary | ICD-10-CM

## 2018-04-03 RX ORDER — ERGOCALCIFEROL 1.25 MG/1
50000 CAPSULE, LIQUID FILLED ORAL WEEKLY
Qty: 8 CAPSULE | Refills: 0 | Status: SHIPPED | OUTPATIENT
Start: 2018-04-03 | End: 2018-05-23

## 2018-04-25 ENCOUNTER — OFFICE VISIT (OUTPATIENT)
Dept: FAMILY MEDICINE | Facility: CLINIC | Age: 23
End: 2018-04-25
Payer: COMMERCIAL

## 2018-04-25 VITALS
WEIGHT: 206.4 LBS | TEMPERATURE: 98.2 F | HEART RATE: 85 BPM | SYSTOLIC BLOOD PRESSURE: 120 MMHG | DIASTOLIC BLOOD PRESSURE: 68 MMHG | BODY MASS INDEX: 27.99 KG/M2 | OXYGEN SATURATION: 98 %

## 2018-04-25 DIAGNOSIS — E55.9 VITAMIN D DEFICIENCY: ICD-10-CM

## 2018-04-25 DIAGNOSIS — F32.1 MODERATE MAJOR DEPRESSION (H): ICD-10-CM

## 2018-04-25 DIAGNOSIS — F41.1 GENERALIZED ANXIETY DISORDER: ICD-10-CM

## 2018-04-25 PROCEDURE — 99214 OFFICE O/P EST MOD 30 MIN: CPT | Performed by: FAMILY MEDICINE

## 2018-04-25 RX ORDER — ESCITALOPRAM OXALATE 20 MG/1
20 TABLET ORAL DAILY
Qty: 90 TABLET | Refills: 1 | Status: SHIPPED | OUTPATIENT
Start: 2018-04-25 | End: 2018-10-27

## 2018-04-25 RX ORDER — ERGOCALCIFEROL 1.25 MG/1
50000 CAPSULE, LIQUID FILLED ORAL WEEKLY
Qty: 8 CAPSULE | Refills: 0 | Status: CANCELLED | OUTPATIENT
Start: 2018-04-25

## 2018-04-25 ASSESSMENT — ANXIETY QUESTIONNAIRES
5. BEING SO RESTLESS THAT IT IS HARD TO SIT STILL: SEVERAL DAYS
2. NOT BEING ABLE TO STOP OR CONTROL WORRYING: SEVERAL DAYS
IF YOU CHECKED OFF ANY PROBLEMS ON THIS QUESTIONNAIRE, HOW DIFFICULT HAVE THESE PROBLEMS MADE IT FOR YOU TO DO YOUR WORK, TAKE CARE OF THINGS AT HOME, OR GET ALONG WITH OTHER PEOPLE: VERY DIFFICULT
1. FEELING NERVOUS, ANXIOUS, OR ON EDGE: NEARLY EVERY DAY
GAD7 TOTAL SCORE: 11
6. BECOMING EASILY ANNOYED OR IRRITABLE: SEVERAL DAYS
7. FEELING AFRAID AS IF SOMETHING AWFUL MIGHT HAPPEN: MORE THAN HALF THE DAYS
3. WORRYING TOO MUCH ABOUT DIFFERENT THINGS: SEVERAL DAYS

## 2018-04-25 ASSESSMENT — PATIENT HEALTH QUESTIONNAIRE - PHQ9: 5. POOR APPETITE OR OVEREATING: MORE THAN HALF THE DAYS

## 2018-04-25 NOTE — MR AVS SNAPSHOT
After Visit Summary   4/25/2018    Mario Raines    MRN: [id number]           Patient Information     Date Of Birth          1995        Visit Information        Provider Department      4/25/2018 2:30 PM Aziza Caal MD [facility]        Today's Diagnoses     Generalized anxiety disorder        Moderate major depression (H)        Vitamin D deficiency           Follow-ups after your visit        Follow-up notes from your care team     Return in about 1 month (around 5/25/2018) for Tele follow up on Depression PHQ/ROBERTO 7;.      Who to contact     Normal or non-critical lab and imaging results will be communicated to you by [employer], letter or phone within 4 business days after the clinic has received the results. If you do not hear from us within 7 days, please contact the clinic through [employer] or phone. If you have a critical or abnormal lab result, we will notify you by phone as soon as possible.  Submit refill requests through [employer] or call your pharmacy and they will forward the refill request to us. Please allow 3 business days for your refill to be completed.          If you need to speak with a  for additional information , please call: [phone]             Additional Information About Your Visit        [employer] Information     [employer] gives you secure access to your electronic health record. If you see a primary care provider, you can also send messages to your care team and make appointments. If you have questions, please call your primary care clinic.  If you do not have a primary care provider, please call [phone] and they will assist you.        Care EveryWhere ID     This is your Care EveryWhere ID. This could be used by other organizations to access your [place] medical records  [id number]        Your Vitals Were     Pulse Temperature Pulse Oximetry BMI (Body Mass Index)          85 98.2  F (36.8  C) (Tympanic) 98% 27.99 kg/m2          Blood Pressure from Last 3 Encounters:   04/25/18 146/73   03/28/18 138/86   10/25/17 121/66    Weight from Last 3 Encounters:   04/25/18 206 lb 6.4 oz (93.6 kg)   03/28/18 207 lb 12.8 oz (94.3 kg)   10/25/17 206 lb (93.4 kg)              Today, you had the following     No orders found for display         Today's Medication Changes          These changes are accurate as of 4/25/18  2:46 PM.  If you have any questions, ask your nurse or doctor.               These medicines have changed or have updated prescriptions.        Dose/Directions    escitalopram 20 MG tablet   Commonly known as:  LEXAPRO   This may have changed:    - medication strength  - how much to take   Used for:  Generalized anxiety disorder, Moderate major depression (H)   Changed by:  Aziza Caal MD        Dose:  20 mg   Take 1 tablet (20 mg) by mouth daily   Quantity:  90 tablet   Refills:  1            Where to get your medicines      These medications were sent to [facility] [place]  [address]  [address] [address]  [address] [address] [place] [address]     Phone:  [phone]     escitalopram 20 MG tablet                Primary Care Provider Office Phone # Fax #    [facility] [phone] [address] [facility] [address]        Equal Access to Services     ROSLYN BURR AH: Hadii syl ku hadasho Soomaali, waaxda luqadaha, qaybta kaalmada adeegyada, waxidris richey hayleonora le. So [facility] [phone].    ATENCIÓN: Si habla español, tiene a montoya disposición servicios gratuitos de asistencia lingüística. Llame al [phone].    We comply with applicable federal civil rights laws and [place] laws. We do not discriminate on the basis of race, color, national origin, age, disability, sex, sexual orientation, or gender identity.            Thank you!     Thank you for choosing [facility]  for your care. Our goal is always to provide you with excellent care. Hearing  back from our patients is one way we can continue to improve our services. Please take a few minutes to complete the written survey that you may receive in the mail after your visit with us. Thank you!             Your Updated Medication List - Protect others around you: Learn how to safely use, store and throw away your medicines at www.disposemymeds.org.          This list is accurate as of 4/25/18  2:46 PM.  Always use your most recent med list.                   Brand Name Dispense Instructions for use Diagnosis    escitalopram 20 MG tablet    LEXAPRO    90 tablet    Take 1 tablet (20 mg) by mouth daily    Generalized anxiety disorder, Moderate major depression (H)       vitamin D 31871 UNIT capsule    ERGOCALCIFEROL    8 capsule    Take 1 capsule (50,000 Units) by mouth once a week for 8 doses    Vitamin D deficiency

## 2018-04-25 NOTE — PROGRESS NOTES
SUBJECTIVE:   Mario Raines is a 22 year old male who presents to clinic today for the following health issues:      Depression and Anxiety Follow-Up    Status since last visit: Improved     Other associated symptoms: Still feeling anxious but symptoms have become more mild since beginning medication     Complicating factors:     Significant life event: No     Current substance abuse: None    PHQ-9 5/15/2017 10/25/2017 3/28/2018   Total Score 13 8 14   Q9: Suicide Ideation More than half the days Not at all Not at all     ROBERTO-7 SCORE 1/14/2013 5/15/2017 3/28/2018   Total Score 8 - -   Total Score - 14 17     In the past two weeks have you had thoughts of suicide or self-harm?  No.    Do you have concerns about your personal safety or the safety of others?   No  PHQ-9  English  PHQ-9   Any Language  ROBERTO-7  Suicide Assessment Five-step Evaluation and Treatment (SAFE-T)    Amount of exercise or physical activity: playing basketball QD    Problems taking medications regularly: No    Medication side effects: Increased appetite     Diet: regular (no restrictions)        ROBERTO-7   [employer], 2002; Used with Permission) 4/25/2018   ROBERTO-7 Total Score =     1. Feeling nervous, anxious, or on edge 3   2. Not being able to stop or control worrying 1   3. Worrying too much about different things 1   4. Trouble relaxing 2   5. Being so restless that it is hard to sit still 1   6. Becoming easily annoyed or irritable 1   7. Feeling afraid, as if something awful might happen 2   ROBERTO-7 Total Score 11   If you checked any problems, how difficult have they made it for you to do your work, take care of things at home, or get along with other people? Very difficult     PHQ-9 ([employer]) 4/25/2018   1.  Little interest or pleasure in doing things 0   2.  Feeling down, depressed, or hopeless 1   3.  Trouble falling or staying asleep, or sleeping too much 3   4.  Feeling tired or having little energy 0   5.  Poor appetite or overeating 3   6.   Feeling bad about yourself 1   7.  Trouble concentrating 1   8.  Moving slowly or restless 0   9.  Suicidal or self-harm thoughts 0   PHQ-9 Total Score 9   Difficulty at work, home, or with people Not difficult at all     Problem list and histories reviewed & adjusted, as indicated.  Additional history: as documented    Patient Active Problem List   Diagnosis     Eczema     Acne     Fifth Metatarsal Bone Styloid/avulsion fracture     Ankle sprain     Deliberate self-cutting     Insomnia     Health Care Home     24 hour contact given to patients monther     Moderate major depression (H)     Common wart     JIMBO (obstructive sleep apnea)     Panic attack     Closed displaced fracture of neck of fifth metacarpal bone of right hand     Vitamin D deficiency     Past Surgical History:   Procedure Laterality Date     NO HISTORY OF SURGERY         Social History   Substance Use Topics     Smoking status: Former Smoker     Types: Cigarettes     Smokeless tobacco: Never Used     Alcohol use No     Family History   Problem Relation Age of Onset     Hypertension Maternal Grandmother      Hypertension Maternal Grandfather      Family History Negative Mother      Family History Negative Father      Family History Negative Paternal Grandmother      HEART DISEASE Paternal Grandfather      heart disease     DIABETES No family hx of      CEREBROVASCULAR DISEASE No family hx of      Breast Cancer No family hx of      Cancer - colorectal No family hx of      Prostate Cancer No family hx of      C.A.D. No family hx of          Current Outpatient Prescriptions   Medication Sig Dispense Refill     escitalopram (LEXAPRO) 20 MG tablet Take 1 tablet (20 mg) by mouth daily 90 tablet 1     vitamin D (ERGOCALCIFEROL) 70280 UNIT capsule Take 1 capsule (50,000 Units) by mouth once a week for 8 doses 8 capsule 0     [DISCONTINUED] escitalopram (LEXAPRO) 10 MG tablet Take 1 tablet (10 mg) by mouth daily 30 tablet 1     No Known Allergies    Reviewed  and updated as needed this visit by clinical staff       Reviewed and updated as needed this visit by Provider         ROS:  Constitutional, HEENT, cardiovascular, pulmonary, gi and gu systems are negative, except as otherwise noted.    OBJECTIVE:     /73  Pulse 85  Temp 98.2  F (36.8  C) (Tympanic)  Wt 206 lb 6.4 oz (93.6 kg)  SpO2 98%  BMI 27.99 kg/m2  Body mass index is 27.99 kg/(m^2).  GENERAL: healthy, alert and no distress  PSYCH: mentation appears normal, affect normal/bright    Diagnostic Test Results:  Reviewed and discussed with patient prior to discharge.  Results for orders placed or performed in visit on 03/28/18   Drug screen urine   Result Value Ref Range    Benzodiazepine Qual Urine Negative NEG^Negative    Cannabinoids Qual Urine Negative NEG^Negative    Cocaine Qual Urine Negative NEG^Negative    Opiates Qualitative Urine Negative NEG^Negative    Acetaminophen Qual Negative NEG^Negative    Amantadine Qual Negative NEG^Negative    Amitriptyline Qual Negative NEG^Negative    Amoxapine Qual Negative NEG^Negative    Amphetamines Qual Negative NEG^Negative    Atropine Qual Negative NEG^Negative    Caffeine Qual Positive (A) NEG^Negative    Carbamazepine Qual Negative NEG^Negative    Chlorpheniramine Qual Negative NEG^Negative    Chlorpromazine Qual Negative NEG^Negative    Citalopram Qual Negative NEG^Negative    Clomipramine Qual Negative NEG^Negative    Cocaine Qual Negative NEG^Negative    Codeine Qual Negative NEG^Negative    Desipramine Qual Negative NEG^Negative    Dextromethorphan Qual Negative NEG^Negative    Diphenhydramine Qual Negative NEG^Negative    Doxepin/metabolite Qual Negative NEG^Negative    Doxylamine Qual Negative NEG^Negative    Ephedrine or pseudo Qual Negative NEG^Negative    Fentanyl Qual Negative NEG^Negative    Fluoxetine and metab Qual Negative NEG^Negative    Hydrocodone Qual Negative NEG^Negative    Hydromorphone Qual Negative NEG^Negative    Ibuprofen Qual  Negative NEG^Negative    Imipramine Qual Negative NEG^Negative    Lamotrigine Qual Negative NEG^Negative    Loxapine Qual Negative NEG^Negative    Maprotiline Qual Negative NEG^Negative    MDMA Qual Negative NEG^Negative    Meperidine Qual Negative NEG^Negative    Methadone Qual Negative NEG^Negative    Methamphetamine Qual Negative NEG^Negative    Morphine Qual Negative NEG^Negative    Nicotine Qual Negative NEG^Negative    Nortriptyline Qual Negative NEG^Negative    Olanzapine Qual Negative NEG^Negative    Oxycodone Qual Negative NEG^Negative    Pentazocine Qual Negative NEG^Negative    Phencyclidine Qual Negative NEG^Negative    Phenmetrazine Qual Negative NEG^Negative    Phentermine Qual Negative NEG^Negative    Phenylbutazone Qual Negative NEG^Negative    Phenylpropanolamine Qual Negative NEG^Negative    Propoxyphene Qual Negative NEG^Negative    Propranolol Qual Negative NEG^Negative    Pyrilamine Qual Negative NEG^Negative    Salicylate Qual Negative NEG^Negative    Theobromine Qual Positive (A) NEG^Negative    Trimipramine Qual Negative NEG^Negative    Topiramate Qual Negative NEG^Negative    Venlafaxine Qual Negative NEG^Negative   CBC with platelets   Result Value Ref Range    WBC 5.4 4.0 - 11.0 10e9/L    RBC Count 4.87 4.4 - 5.9 10e12/L    Hemoglobin 15.3 13.3 - 17.7 g/dL    Hematocrit 43.3 40.0 - 53.0 %    MCV 89 78 - 100 fl    MCH 31.4 26.5 - 33.0 pg    MCHC 35.3 31.5 - 36.5 g/dL    RDW 12.5 10.0 - 15.0 %    Platelet Count 308 150 - 450 10e9/L   Comprehensive metabolic panel   Result Value Ref Range    Sodium 138 133 - 144 mmol/L    Potassium 4.0 3.4 - 5.3 mmol/L    Chloride 103 94 - 109 mmol/L    Carbon Dioxide 26 20 - 32 mmol/L    Anion Gap 9 3 - 14 mmol/L    Glucose 91 70 - 99 mg/dL    Urea Nitrogen 6 (L) 7 - 30 mg/dL    Creatinine 0.92 0.66 - 1.25 mg/dL    GFR Estimate >90 >60 mL/min/1.7m2    GFR Estimate If Black >90 >60 mL/min/1.7m2    Calcium 9.4 8.5 - 10.1 mg/dL    Bilirubin Total 0.4 0.2 - 1.3  mg/dL    Albumin 4.5 3.4 - 5.0 g/dL    Protein Total 8.3 6.8 - 8.8 g/dL    Alkaline Phosphatase 79 40 - 150 U/L    ALT 19 0 - 70 U/L    AST 16 0 - 45 U/L   TSH with free T4 reflex   Result Value Ref Range    TSH 1.11 0.40 - 4.00 mU/L   Vitamin D Deficiency   Result Value Ref Range    Vitamin D Deficiency screening 14 (L) 20 - 75 ug/L   Vitamin B12   Result Value Ref Range    Vitamin B12 560 193 - 986 pg/mL         ASSESSMENT/PLAN:     Mario was seen today for depression and anxiety.    Diagnoses and all orders for this visit:    Generalized anxiety disorder, improving        - PHQ-9/ROBERTO 7 completed, see Epic for details    - Increase dose: escitalopram (LEXAPRO) 20 MG tablet; Take 1 tablet (20 mg) by mouth daily    Moderate major depression (H); improving  -  PHQ-9/ROBERTO 7 completed, see Epic for details     - Increase dose: escitalopram (LEXAPRO) 20 MG tablet; Take 1 tablet (20 mg) by mouth daily    Vitamin D deficiency  On vitamin D (ERGOCALCIFEROL) 00924 UNIT capsule; Take 1 capsule (50,000 Units) by mouth once a week x 8 weeks then OTC vitamin D 2000 units/day thereafter for maintenance.   Repeat lab in 6 months    Tele follow up in a month.      Follow up with me in 3-6 months.     Aziza Caal MD  [facility]

## 2018-04-26 ASSESSMENT — PATIENT HEALTH QUESTIONNAIRE - PHQ9: SUM OF ALL RESPONSES TO PHQ QUESTIONS 1-9: 9

## 2018-04-26 ASSESSMENT — ANXIETY QUESTIONNAIRES: GAD7 TOTAL SCORE: 11

## 2018-08-09 ENCOUNTER — TELEPHONE (OUTPATIENT)
Dept: FAMILY MEDICINE | Facility: CLINIC | Age: 23
End: 2018-08-09

## 2018-08-09 ENCOUNTER — MYC MEDICAL ADVICE (OUTPATIENT)
Dept: FAMILY MEDICINE | Facility: CLINIC | Age: 23
End: 2018-08-09

## 2018-08-09 NOTE — TELEPHONE ENCOUNTER
Patient is on Depression Remission List.    PHQ9 is due between 7/28/18 and 11/28/18.    Please call/mychart patient to complete phq9. Sharda Bui MA

## 2018-08-09 NOTE — LETTER
August 27, 2018      Mario Raines  [address] [id number]        Dear Mario,       We have tried to contact you by phone several times.     Please schedule a office visit to discuss depression and anxiety.    Sincerely,        Aziza Caal MD/mp

## 2018-08-13 ASSESSMENT — ANXIETY QUESTIONNAIRES
2. NOT BEING ABLE TO STOP OR CONTROL WORRYING: NOT AT ALL
1. FEELING NERVOUS, ANXIOUS, OR ON EDGE: MORE THAN HALF THE DAYS
7. FEELING AFRAID AS IF SOMETHING AWFUL MIGHT HAPPEN: SEVERAL DAYS
5. BEING SO RESTLESS THAT IT IS HARD TO SIT STILL: NOT AT ALL
3. WORRYING TOO MUCH ABOUT DIFFERENT THINGS: SEVERAL DAYS
GAD7 TOTAL SCORE: 7
7. FEELING AFRAID AS IF SOMETHING AWFUL MIGHT HAPPEN: SEVERAL DAYS
GAD7 TOTAL SCORE: 7
GAD7 TOTAL SCORE: 7
4. TROUBLE RELAXING: MORE THAN HALF THE DAYS
6. BECOMING EASILY ANNOYED OR IRRITABLE: SEVERAL DAYS

## 2018-08-13 ASSESSMENT — PATIENT HEALTH QUESTIONNAIRE - PHQ9
SUM OF ALL RESPONSES TO PHQ QUESTIONS 1-9: 8
SUM OF ALL RESPONSES TO PHQ QUESTIONS 1-9: 8
10. IF YOU CHECKED OFF ANY PROBLEMS, HOW DIFFICULT HAVE THESE PROBLEMS MADE IT FOR YOU TO DO YOUR WORK, TAKE CARE OF THINGS AT HOME, OR GET ALONG WITH OTHER PEOPLE: NOT DIFFICULT AT ALL

## 2018-08-13 NOTE — TELEPHONE ENCOUNTER
Left message for patient to call back pod 1 hotline([phone])    Patient is on Depression Remission List.     PHQ9 is due between 7/28/18 and 11/28/18.     Please call/mychart patient to complete phq9. Sharda Bui MA

## 2018-08-14 ASSESSMENT — ANXIETY QUESTIONNAIRES: GAD7 TOTAL SCORE: 7

## 2018-08-14 ASSESSMENT — PATIENT HEALTH QUESTIONNAIRE - PHQ9: SUM OF ALL RESPONSES TO PHQ QUESTIONS 1-9: 8

## 2018-08-20 NOTE — TELEPHONE ENCOUNTER
Left message for patient to call back pod 1 hotline([phone])    Aziza Caal MD   Be [facility]/Lpn Pool 5 days ago                 Noted. Patient is due for follow up. Please help him schedule.

## 2018-08-23 NOTE — TELEPHONE ENCOUNTER
Raquel Snow contacted Mario on 08/23/18 and left a message. If patient calls back please schedule appointment for follow up on depression with Dr. Caal.

## 2018-08-27 NOTE — TELEPHONE ENCOUNTER
Left message for patient to call back pod 1 hotline([phone])    3 attempts made with no response. Letter sent. Sharda Bui MA

## 2018-10-27 DIAGNOSIS — F32.1 MODERATE MAJOR DEPRESSION (H): ICD-10-CM

## 2018-10-27 DIAGNOSIS — F41.1 GENERALIZED ANXIETY DISORDER: ICD-10-CM

## 2018-10-29 NOTE — TELEPHONE ENCOUNTER
PHQ-9 SCORE 3/28/2018 4/25/2018 8/13/2018   Total Score - - -   Total Score MyChart - - 8 (Mild depression)   Total Score 14 9 8     Routing refill request to provider for review/approval because:  PHQ9 score greater than 4, RN unable to fill.    Candy Thompson, RN, BSN

## 2018-10-29 NOTE — TELEPHONE ENCOUNTER
"Requested Prescriptions   Pending Prescriptions Disp Refills     escitalopram (LEXAPRO) 20 MG tablet [Pharmacy Med Name: ESCITALOPRAM OXALATE 20MG TABS] 90 tablet 1    Last Written Prescription Date: 7 -25-18  Last Fill Quantity: 90,  # refills: 1   Last office visit: 4/25/2018 with prescribing provider:  4-25-18   Future Office Visit:   Sig: TAKE ONE TABLET BY MOUTH EVERY DAY    SSRIs Protocol Failed    10/27/2018  5:44 PM       Failed - PHQ-9 score less than 5 in past 6 months    Please review last PHQ-9 score.          Failed - Recent (6 mo) or future (30 days) visit within the authorizing provider's specialty    Patient had office visit in the last 6 months or has a visit in the next 30 days with authorizing provider or within the authorizing provider's specialty.  See \"Patient Info\" tab in inbasket, or \"Choose Columns\" in Meds & Orders section of the refill encounter.           Passed - Patient is age 18 or older        "

## 2018-10-30 RX ORDER — ESCITALOPRAM OXALATE 20 MG/1
TABLET ORAL
Qty: 90 TABLET | Refills: 0 | Status: SHIPPED | OUTPATIENT
Start: 2018-10-30 | End: 2019-01-28

## 2019-01-28 DIAGNOSIS — F41.1 GENERALIZED ANXIETY DISORDER: ICD-10-CM

## 2019-01-28 DIAGNOSIS — F32.1 MODERATE MAJOR DEPRESSION (H): ICD-10-CM

## 2019-01-29 RX ORDER — ESCITALOPRAM OXALATE 20 MG/1
TABLET ORAL
Qty: 15 TABLET | Refills: 0 | Status: SHIPPED | OUTPATIENT
Start: 2019-01-29 | End: 2019-02-13

## 2019-01-29 NOTE — TELEPHONE ENCOUNTER
Routing refill request to provider for review/approval because:  Akosua given x1 and patient did not follow up, please advise.  Pended for 1 month with reminder appt due.  Judith Thompson RN

## 2019-01-29 NOTE — TELEPHONE ENCOUNTER
"Requested Prescriptions   Pending Prescriptions Disp Refills     escitalopram (LEXAPRO) 20 MG tablet [Pharmacy Med Name: ESCITALOPRAM OXALATE 20MG TABS] 90 tablet 0    Last Written Prescription Date:  10-30-18  Last Fill Quantity: 90,  # refills: 0   Last office visit: 4/25/2018 with prescribing provider:  4-25-18   Future Office Visit:   Sig: TAKE ONE TABLET BY MOUTH EVERY DAY    SSRIs Protocol Failed - 1/28/2019  9:06 PM       Failed - PHQ-9 score less than 5 in past 6 months    Please review last PHQ-9 score.          Failed - Recent (6 mo) or future (30 days) visit within the authorizing provider's specialty    Patient had office visit in the last 6 months or has a visit in the next 30 days with authorizing provider or within the authorizing provider's specialty.  See \"Patient Info\" tab in inbasket, or \"Choose Columns\" in Meds & Orders section of the refill encounter.           Passed - Medication is active on med list       Passed - Patient is age 18 or older        "

## 2019-01-29 NOTE — TELEPHONE ENCOUNTER
Akosua already given.   Refill x 15 days.  Please remind patient that he is overdue for follow up.

## 2019-02-13 DIAGNOSIS — F41.1 GENERALIZED ANXIETY DISORDER: ICD-10-CM

## 2019-02-13 DIAGNOSIS — F32.1 MODERATE MAJOR DEPRESSION (H): ICD-10-CM

## 2019-02-14 RX ORDER — ESCITALOPRAM OXALATE 20 MG/1
TABLET ORAL
Qty: 15 TABLET | Refills: 0 | Status: SHIPPED | OUTPATIENT
Start: 2019-02-14 | End: 2019-03-04

## 2019-02-14 NOTE — TELEPHONE ENCOUNTER
"Requested Prescriptions   Pending Prescriptions Disp Refills     escitalopram (LEXAPRO) 20 MG tablet [Pharmacy Med Name: ESCITALOPRAM OXALATE 20MG TABS] 15 tablet 0    Last Written Prescription Date:  1-29-19  Last Fill Quantity: 15,  # refills: 0   Last office visit: 4/25/2018 with prescribing provider:  4-25-18   Future Office Visit:   Sig: TAKE ONE TABLET BY MOUTH ONCE DAILY (NEED TO BE SEEN IN CLINIC FOR FURTHER REFILLS)    SSRIs Protocol Failed - 2/13/2019  8:54 PM       Failed - PHQ-9 score less than 5 in past 6 months    Please review last PHQ-9 score.          Failed - Recent (6 mo) or future (30 days) visit within the authorizing provider's specialty    Patient had office visit in the last 6 months or has a visit in the next 30 days with authorizing provider or within the authorizing provider's specialty.  See \"Patient Info\" tab in inbasket, or \"Choose Columns\" in Meds & Orders section of the refill encounter.           Passed - Medication is active on med list       Passed - Patient is age 18 or older        "

## 2019-02-14 NOTE — TELEPHONE ENCOUNTER
Routing refill request to provider for review/approval because:  Akosua given x1 and patient did not follow up, please advise    Last OV with Dr. Caal:  4/25/18 with advised 1 month F/U via telephone.    PHQ-9 SCORE 3/28/2018 4/25/2018 8/13/2018   PHQ-9 Total Score - - -   PHQ-9 Total Score MyChart - - 8 (Mild depression)   PHQ-9 Total Score 14 9 8     Called patient to schedule appointment, no answer. Left message on voice mail for patient to call clinic. [phone]    Candy Thompson, RN, BSN

## 2019-03-04 ENCOUNTER — OFFICE VISIT (OUTPATIENT)
Dept: FAMILY MEDICINE | Facility: CLINIC | Age: 24
End: 2019-03-04
Payer: COMMERCIAL

## 2019-03-04 VITALS
SYSTOLIC BLOOD PRESSURE: 146 MMHG | TEMPERATURE: 97.5 F | HEIGHT: 72 IN | BODY MASS INDEX: 35.19 KG/M2 | DIASTOLIC BLOOD PRESSURE: 79 MMHG | OXYGEN SATURATION: 96 % | WEIGHT: 259.8 LBS | HEART RATE: 83 BPM

## 2019-03-04 DIAGNOSIS — R03.0 ELEVATED BLOOD PRESSURE READING WITHOUT DIAGNOSIS OF HYPERTENSION: ICD-10-CM

## 2019-03-04 DIAGNOSIS — E66.01 MORBID OBESITY (H): ICD-10-CM

## 2019-03-04 DIAGNOSIS — F41.1 GENERALIZED ANXIETY DISORDER: Primary | ICD-10-CM

## 2019-03-04 DIAGNOSIS — F32.1 MODERATE MAJOR DEPRESSION (H): ICD-10-CM

## 2019-03-04 PROCEDURE — 99213 OFFICE O/P EST LOW 20 MIN: CPT | Performed by: FAMILY MEDICINE

## 2019-03-04 RX ORDER — ESCITALOPRAM OXALATE 20 MG/1
20 TABLET ORAL DAILY
Qty: 90 TABLET | Refills: 1 | Status: SHIPPED | OUTPATIENT
Start: 2019-03-04 | End: 2019-09-09

## 2019-03-04 ASSESSMENT — ANXIETY QUESTIONNAIRES
6. BECOMING EASILY ANNOYED OR IRRITABLE: NOT AT ALL
2. NOT BEING ABLE TO STOP OR CONTROL WORRYING: NOT AT ALL
7. FEELING AFRAID AS IF SOMETHING AWFUL MIGHT HAPPEN: SEVERAL DAYS
IF YOU CHECKED OFF ANY PROBLEMS ON THIS QUESTIONNAIRE, HOW DIFFICULT HAVE THESE PROBLEMS MADE IT FOR YOU TO DO YOUR WORK, TAKE CARE OF THINGS AT HOME, OR GET ALONG WITH OTHER PEOPLE: SOMEWHAT DIFFICULT
5. BEING SO RESTLESS THAT IT IS HARD TO SIT STILL: NOT AT ALL
3. WORRYING TOO MUCH ABOUT DIFFERENT THINGS: NOT AT ALL
GAD7 TOTAL SCORE: 3
1. FEELING NERVOUS, ANXIOUS, OR ON EDGE: SEVERAL DAYS

## 2019-03-04 ASSESSMENT — MIFFLIN-ST. JEOR: SCORE: 2216.45

## 2019-03-04 ASSESSMENT — PATIENT HEALTH QUESTIONNAIRE - PHQ9
SUM OF ALL RESPONSES TO PHQ QUESTIONS 1-9: 5
5. POOR APPETITE OR OVEREATING: SEVERAL DAYS

## 2019-03-04 NOTE — PROGRESS NOTES
SUBJECTIVE:   Mario Raines is a 23 year old male who presents to clinic today for the following health issues:      Depression and Anxiety Follow-Up    Status since last visit: Improved on Lexapro 20 mg/day-tolerating well.     Other associated symptoms:None    Complicating factors:     Significant life event: No     Current substance abuse: None    PHQ 3/28/2018 4/25/2018 8/13/2018   PHQ-9 Total Score 14 9 8   Q9: Suicide Ideation Not at all Not at all Not at all     ROBERTO-7 SCORE 3/28/2018 4/25/2018 8/13/2018   Total Score - - -   Total Score - - 7 (mild anxiety)   Total Score 17 11 7     In the past two weeks have you had thoughts of suicide or self-harm?  No.    Do you have concerns about your personal safety or the safety of others?   No  PHQ-9  English  PHQ-9   Any Language  ROBERTO-7  Suicide Assessment Five-step Evaluation and Treatment (SAFE-T)    Amount of exercise or physical activity: None    Problems taking medications regularly: No    Medication side effects: none    Diet: regular (no restrictions)        Blood pressure and weight is up since the last office visit.   Patient admits that he has not been careful with hie diet, portions and eats throughout the day.     BP Readings from Last 3 Encounters:   03/04/19 146/79   04/25/18 120/68   03/28/18 138/86       Wt Readings from Last 4 Encounters:   03/04/19 117.8 kg (259 lb 12.8 oz)   04/25/18 93.6 kg (206 lb 6.4 oz)   03/28/18 94.3 kg (207 lb 12.8 oz)   10/25/17 93.4 kg (206 lb)         Problem list and histories reviewed & adjusted, as indicated.  Additional history: as documented    Patient Active Problem List   Diagnosis     Eczema     Acne     Fifth Metatarsal Bone Styloid/avulsion fracture     Ankle sprain     Deliberate self-cutting     Insomnia     Health Care Home     24 hour contact given to patients monther     Moderate major depression (H)     Common wart     JIMBO (obstructive sleep apnea)     Panic attack     Closed displaced fracture of neck of  fifth metacarpal bone of right hand     Vitamin D deficiency     Obesity (BMI 35.0-39.9) with comorbidity (H)     Past Surgical History:   Procedure Laterality Date     NO HISTORY OF SURGERY         Social History     Tobacco Use     Smoking status: Former Smoker     Types: Cigarettes     Smokeless tobacco: Never Used   Substance Use Topics     Alcohol use: No     Family History   Problem Relation Age of Onset     Hypertension Maternal Grandmother      Hypertension Maternal Grandfather      Family History Negative Mother      Family History Negative Father      Family History Negative Paternal Grandmother      Heart Disease Paternal Grandfather         heart disease     Diabetes No family hx of      Cerebrovascular Disease No family hx of      Breast Cancer No family hx of      Cancer - colorectal No family hx of      Prostate Cancer No family hx of      C.A.D. No family hx of          Current Outpatient Medications   Medication Sig Dispense Refill     escitalopram (LEXAPRO) 20 MG tablet Take 1 tablet (20 mg) by mouth daily 90 tablet 1     No Known Allergies    Reviewed and updated as needed this visit by clinical staff       Reviewed and updated as needed this visit by Provider         ROS:  Constitutional, HEENT, cardiovascular, pulmonary, gi and gu systems are negative, except as otherwise noted.    OBJECTIVE:     /79   Pulse 83   Temp 97.5  F (36.4  C) (Tympanic)   Ht 1.829 m (6')   Wt 117.8 kg (259 lb 12.8 oz)   SpO2 96%   BMI 35.24 kg/m    Body mass index is 35.24 kg/m .  GENERAL: healthy, alert and no distress  PSYCH: mentation appears normal, affect normal/bright, judgement and insight intact and appearance well groomed    Diagnostic Test Results:  none     ASSESSMENT/PLAN:     Mario was seen today for depression and anxiety.    Diagnoses and all orders for this visit:    Generalized anxiety disorder, stable on medications  - PHQ-9/ROBERTO 7 completed, see above/Epic for details    - Refill:  escitalopram (LEXAPRO) 20 MG tablet; Take 1 tablet (20 mg) by mouth daily    Moderate major depression (H), stable on medications   - PHQ-9/ROBERTO 7 completed, see above/Epic for details    - Refill: escitalopram (LEXAPRO) 20 MG tablet; Take 1 tablet (20 mg) by mouth daily    Elevated blood pressure reading without diagnosis of hypertension  Repeat BP at the end of the visit was same- elevated above goal.   Repeat BP within 4 weeks; if persistently >140/90- consider initiating hypertension evaluation and treatment.   In the interim recommended portion control, regular activity and a healthy well balanced DASH diet.     Morbid obesity (H)  Weight management plan: Discussed healthy diet and exercise guidelines      Follow up within a month for repeat BP and Annual Physical at earliest convenience.       Aziza Caal MD  [facility]

## 2019-03-05 ASSESSMENT — ANXIETY QUESTIONNAIRES: GAD7 TOTAL SCORE: 3

## 2019-04-12 ENCOUNTER — OFFICE VISIT (OUTPATIENT)
Dept: FAMILY MEDICINE | Facility: CLINIC | Age: 24
End: 2019-04-12
Payer: COMMERCIAL

## 2019-04-12 VITALS
BODY MASS INDEX: 35.18 KG/M2 | RESPIRATION RATE: 16 BRPM | DIASTOLIC BLOOD PRESSURE: 70 MMHG | SYSTOLIC BLOOD PRESSURE: 132 MMHG | HEART RATE: 74 BPM | WEIGHT: 259.4 LBS | TEMPERATURE: 98.4 F | OXYGEN SATURATION: 98 %

## 2019-04-12 DIAGNOSIS — H57.11 EYE PAIN, RIGHT: ICD-10-CM

## 2019-04-12 DIAGNOSIS — H10.31 ACUTE BACTERIAL CONJUNCTIVITIS OF RIGHT EYE: Primary | ICD-10-CM

## 2019-04-12 PROCEDURE — 99213 OFFICE O/P EST LOW 20 MIN: CPT | Performed by: PHYSICIAN ASSISTANT

## 2019-04-12 RX ORDER — TOBRAMYCIN 3 MG/ML
2 SOLUTION/ DROPS OPHTHALMIC EVERY 4 HOURS
Qty: 1 BOTTLE | Refills: 0 | Status: SHIPPED | OUTPATIENT
Start: 2019-04-12 | End: 2019-04-19

## 2019-04-12 ASSESSMENT — PAIN SCALES - GENERAL: PAINLEVEL: EXTREME PAIN (9)

## 2019-04-12 NOTE — PATIENT INSTRUCTIONS
I suggest you schedule an appointment with an optometrist for Monday.  Let's start antibiotic drops to see if your symptoms improve, and if they do and you have no pain, you can cancel the specialist appointment.    If your pain worsens or you have changes/worsening of your vision, I suggest you be seen in the ER right away.

## 2019-04-12 NOTE — PROGRESS NOTES
SUBJECTIVE:   Mario Raines is a 23 year old male who presents to clinic today for the following   health issues:      ENT Symptoms             Symptoms: cc Present Absent Comment   Fever/Chills  x  Feels warm but hasn't checked temp   Fatigue  x     Muscle Aches   x    Eye Irritation  x  Right eye painful and red x 3 days   Sneezing  x     Nasal Jason/Drg  x     Sinus Pressure/Pain  x     Loss of smell  x     Dental pain   x    Sore Throat   x    Swollen Glands  x     Ear Pain/Fullness   x    Cough x x  Non Productive   Wheeze   x    Chest Pain   x    Shortness of breath   x    Rash   x    Other   x      Symptom duration:  5 days   Symptom severity:  moderate cough/cold symptoms, severe eye symptoms   Treatments tried:  Eye wash, warm eye mask   Contacts:  no known exposure to illness     He c/o right eye pain and blurry vision at times.  No double vision or black out.     No discharge and eye not stuck shut in am.     No FB sensation.          Additional history: as documented    Reviewed  and updated as needed this visit by clinical staff         Reviewed and updated as needed this visit by Provider         Patient Active Problem List   Diagnosis     Eczema     Acne     Fifth Metatarsal Bone Styloid/avulsion fracture     Ankle sprain     Deliberate self-cutting     Insomnia     Health Care Home     24 hour contact given to patients monther     Moderate major depression (H)     Common wart     JIMBO (obstructive sleep apnea)     Panic attack     Closed displaced fracture of neck of fifth metacarpal bone of right hand     Vitamin D deficiency     Obesity (BMI 35.0-39.9) with comorbidity (H)     Past Surgical History:   Procedure Laterality Date     NO HISTORY OF SURGERY         Social History     Tobacco Use     Smoking status: Former Smoker     Types: Cigarettes     Smokeless tobacco: Former User     Types: Snuff   Substance Use Topics     Alcohol use: No     Family History   Problem Relation Age of Onset      Hypertension Maternal Grandmother      Hypertension Maternal Grandfather      Family History Negative Mother      Family History Negative Father      Family History Negative Paternal Grandmother      Heart Disease Paternal Grandfather         heart disease     Diabetes No family hx of      Cerebrovascular Disease No family hx of      Breast Cancer No family hx of      Cancer - colorectal No family hx of      Prostate Cancer No family hx of      C.A.D. No family hx of          Current Outpatient Medications   Medication Sig Dispense Refill     escitalopram (LEXAPRO) 20 MG tablet Take 1 tablet (20 mg) by mouth daily 90 tablet 1     tobramycin (TOBREX) 0.3 % ophthalmic solution Place 2 drops into the right eye every 4 hours for 7 days 1 Bottle 0     BP Readings from Last 3 Encounters:   04/12/19 132/70   03/04/19 146/79   04/25/18 120/68    Wt Readings from Last 3 Encounters:   04/12/19 117.7 kg (259 lb 6.4 oz)   03/04/19 117.8 kg (259 lb 12.8 oz)   04/25/18 93.6 kg (206 lb 6.4 oz)                    ROS:  Constitutional, HEENT, cardiovascular, pulmonary, gi and gu systems are negative, except as otherwise noted.    OBJECTIVE:     /70 (BP Location: Right arm, Patient Position: Sitting, Cuff Size: Adult Large)   Pulse 74   Temp 98.4  F (36.9  C) (Tympanic)   Resp 16   Wt 117.7 kg (259 lb 6.4 oz)   SpO2 98%   BMI 35.18 kg/m    Body mass index is 35.18 kg/m .  GENERAL: healthy, alert and no distress  EYES: PERRL, EOMI, eyelids- normal, no swelling, conjunctiva/corneas- conjunctival injection OD,  and fundi- normal    Diagnostic Test Results:  none     ASSESSMENT/PLAN:       1. Acute bacterial conjunctivitis of right eye  I do not see any red flags on exam today, however there is very little drainage and with the pain, I suggest close follow-up with an optometrist if symptoms do not improve with the eye drops.     - tobramycin (TOBREX) 0.3 % ophthalmic solution; Place 2 drops into the right eye every 4 hours  for 7 days  Dispense: 1 Bottle; Refill: 0    2. Eye pain, right  Patient Instructions   I suggest you schedule an appointment with an optometrist for Monday.  Let's start antibiotic drops to see if your symptoms improve, and if they do and you have no pain, you can cancel the specialist appointment.    If your pain worsens or you have changes/worsening of your vision, I suggest you be seen in the ER right away.           - OPTOMETRY REFERRAL        Pauline Yen PA-C  [facility]

## 2019-09-09 DIAGNOSIS — F41.1 GENERALIZED ANXIETY DISORDER: ICD-10-CM

## 2019-09-09 DIAGNOSIS — F32.1 MODERATE MAJOR DEPRESSION (H): ICD-10-CM

## 2019-09-09 NOTE — TELEPHONE ENCOUNTER
"Requested Prescriptions   Pending Prescriptions Disp Refills     escitalopram (LEXAPRO) 20 MG tablet [Pharmacy Med Name: ESCITALOPRAM OXALATE 20MG TABS] 90 tablet 1     Sig: TAKE ONE TABLET BY MOUTH ONCE DAILY  Last Written Prescription Date:  5/31/19  Last Fill Quantity: 90,  # refills: 1   Last office visit: 4/12/2019 with prescribing provider:  GERI Yen  Future Office Visit:         SSRIs Protocol Failed - 9/9/2019  5:42 PM        Failed - PHQ-9 score less than 5 in past 6 months     Please review last PHQ-9 score.           Passed - Medication is active on med list        Passed - Patient is age 18 or older        Passed - Recent (6 mo) or future (30 days) visit within the authorizing provider's specialty     Patient had office visit in the last 6 months or has a visit in the next 30 days with authorizing provider or within the authorizing provider's specialty.  See \"Patient Info\" tab in inbasket, or \"Choose Columns\" in Meds & Orders section of the refill encounter.            "

## 2019-09-10 NOTE — TELEPHONE ENCOUNTER
Patient is due for physical.   PHQ-9 score:    PHQ-9 SCORE 3/4/2019   PHQ-9 Total Score -   PHQ-9 Total Score MyChart -   PHQ-9 Total Score 5     30 day constantine period fill pended for provider approval with reminder to patient to schedule appointment for further refills.   Please advise on constantine refill.     Michelle Berg RN, BSN, PHN

## 2019-09-11 RX ORDER — ESCITALOPRAM OXALATE 20 MG/1
TABLET ORAL
Qty: 30 TABLET | Refills: 0 | Status: SHIPPED | OUTPATIENT
Start: 2019-09-11 | End: 2019-10-22

## 2019-10-15 DIAGNOSIS — F41.1 GENERALIZED ANXIETY DISORDER: ICD-10-CM

## 2019-10-15 DIAGNOSIS — F32.1 MODERATE MAJOR DEPRESSION (H): ICD-10-CM

## 2019-10-15 NOTE — LETTER
[facility] [place]  [address]  [place] [address]  Phone: [phone]        November 4, 2019      Mario Raines                                                                                                                                [id number] [address]            Dear Mario,    We are concerned about your health care.  We recently provided you with a medication refill.  Many medications require routine follow-up with your Doctor.      At this time we ask that: You schedule a routine office visit with your physician to follow your depression    Your prescription: Has been refilled for 1 month so you may have time for the above noted follow-up.      Thank you,    BETSY [facility]/rosana

## 2019-10-15 NOTE — TELEPHONE ENCOUNTER
Requested Prescriptions   Pending Prescriptions Disp Refills     escitalopram (LEXAPRO) 20 MG tablet 30 tablet 0     Sig: Take 1 tablet (20 mg) by mouth daily  Last Written Prescription Date:  9/11/19  Last Fill Quantity: 30,  # refills: 1   Last office visit: 4/12/2019 with prescribing provider:  GERI Yen   Future Office Visit:         There is no refill protocol information for this order

## 2019-10-17 NOTE — TELEPHONE ENCOUNTER
Routing refill request to provider for review/approval because:  Akosua given x1 and patient did not follow up, please advise

## 2019-10-22 DIAGNOSIS — F41.1 GENERALIZED ANXIETY DISORDER: ICD-10-CM

## 2019-10-22 DIAGNOSIS — F32.1 MODERATE MAJOR DEPRESSION (H): ICD-10-CM

## 2019-10-22 NOTE — TELEPHONE ENCOUNTER
"Requested Prescriptions   Pending Prescriptions Disp Refills     escitalopram (LEXAPRO) 20 MG tablet [Pharmacy Med Name: ESCITALOPRAM OXALATE 20MG TABS]  Last Written Prescription Date:  09/11/19  Last Fill Quantity: 30,  # refills: 0   Last office visit: 03/14/2019 with prescribing provider:  BIGG Caal   Future Office Visit:     30 tablet 0     Sig: TAKE ONE TABLET BY MOUTH ONCE DAILY (NEED TO BE SEEN IN CLINIC FOR FURTHER REFILLS)       SSRIs Protocol Failed - 10/22/2019  1:58 PM   ROBERTO-7 SCORE 4/25/2018 8/13/2018 3/4/2019   Total Score - - -   Total Score - 7 (mild anxiety) -   Total Score 11 7 3       PHQ-9 SCORE 4/25/2018 8/13/2018 3/4/2019   PHQ-9 Total Score - - -   PHQ-9 Total Score MyChart - 8 (Mild depression) -   PHQ-9 Total Score 9 8 5        Failed - PHQ-9 score less than 5 in past 6 months     Please review last PHQ-9 score.           Failed - Recent (6 mo) or future (30 days) visit within the authorizing provider's specialty     Patient had office visit in the last 6 months or has a visit in the next 30 days with authorizing provider or within the authorizing provider's specialty.  See \"Patient Info\" tab in inbasket, or \"Choose Columns\" in Meds & Orders section of the refill encounter.            Passed - Medication is active on med list        Passed - Patient is age 18 or older          "

## 2019-10-22 NOTE — TELEPHONE ENCOUNTER
PHQ-9 score:    PHQ-9 SCORE 3/4/2019   PHQ-9 Total Score -   PHQ-9 Total Score MyChart -   PHQ-9 Total Score 5     See 10-15-19 refill encounter.   MA called patient x2 with no return call for PHQ-9 update.     Michelle Berg, RN, BSN, PHN

## 2019-10-24 RX ORDER — ESCITALOPRAM OXALATE 20 MG/1
TABLET ORAL
Qty: 30 TABLET | Refills: 0 | Status: SHIPPED | OUTPATIENT
Start: 2019-10-24 | End: 2019-12-23

## 2019-10-24 ASSESSMENT — ANXIETY QUESTIONNAIRES
6. BECOMING EASILY ANNOYED OR IRRITABLE: NOT AT ALL
7. FEELING AFRAID AS IF SOMETHING AWFUL MIGHT HAPPEN: SEVERAL DAYS
5. BEING SO RESTLESS THAT IT IS HARD TO SIT STILL: SEVERAL DAYS
2. NOT BEING ABLE TO STOP OR CONTROL WORRYING: SEVERAL DAYS
1. FEELING NERVOUS, ANXIOUS, OR ON EDGE: SEVERAL DAYS
IF YOU CHECKED OFF ANY PROBLEMS ON THIS QUESTIONNAIRE, HOW DIFFICULT HAVE THESE PROBLEMS MADE IT FOR YOU TO DO YOUR WORK, TAKE CARE OF THINGS AT HOME, OR GET ALONG WITH OTHER PEOPLE: VERY DIFFICULT
3. WORRYING TOO MUCH ABOUT DIFFERENT THINGS: SEVERAL DAYS
GAD7 TOTAL SCORE: 7

## 2019-10-24 ASSESSMENT — PATIENT HEALTH QUESTIONNAIRE - PHQ9
5. POOR APPETITE OR OVEREATING: MORE THAN HALF THE DAYS
SUM OF ALL RESPONSES TO PHQ QUESTIONS 1-9: 5

## 2019-10-25 ASSESSMENT — ANXIETY QUESTIONNAIRES: GAD7 TOTAL SCORE: 7

## 2019-10-28 RX ORDER — ESCITALOPRAM OXALATE 20 MG/1
20 TABLET ORAL DAILY
Qty: 30 TABLET | Refills: 0 | Status: SHIPPED | OUTPATIENT
Start: 2019-10-28 | End: 2019-12-21

## 2019-11-03 ENCOUNTER — HEALTH MAINTENANCE LETTER (OUTPATIENT)
Age: 24
End: 2019-11-03

## 2019-12-11 ENCOUNTER — TELEPHONE (OUTPATIENT)
Dept: FAMILY MEDICINE | Facility: CLINIC | Age: 24
End: 2019-12-11

## 2019-12-18 NOTE — PROGRESS NOTES
Subjective     Mario Raines is a 24 year old male who presents to clinic today for the following health issues:    HPI   Depression and Anxiety Follow-Up    How are you doing with your depression since your last visit? Improved     How are you doing with your anxiety since your last visit?  Improved     Are you having other symptoms that might be associated with depression or anxiety? No    Have you had a significant life event? No     Do you have any concerns with your use of alcohol or other drugs? No     Not sleeping well, has tried otc benadryl and melatonin- sometimes helps.     Social History     Tobacco Use     Smoking status: Former Smoker     Packs/day: 0.00     Years: 0.00     Pack years: 0.00     Types: Cigarettes     Smokeless tobacco: Former User     Types: Snuff   Substance Use Topics     Alcohol use: No     Drug use: No     Comment: past-marijuana and meth     PHQ 3/4/2019 10/24/2019 12/23/2019   PHQ-9 Total Score 5 5 6   Q9: Thoughts of better off dead/self-harm past 2 weeks Not at all Not at all Not at all     ROBERTO-7 SCORE 3/4/2019 10/24/2019 12/23/2019   Total Score - - -   Total Score - - -   Total Score 3 7 6     Last PHQ-9 12/23/2019   1.  Little interest or pleasure in doing things 0   2.  Feeling down, depressed, or hopeless 1   3.  Trouble falling or staying asleep, or sleeping too much 3   4.  Feeling tired or having little energy 0   5.  Poor appetite or overeating 1   6.  Feeling bad about yourself 1   7.  Trouble concentrating 0   8.  Moving slowly or restless 0   Q9: Thoughts of better off dead/self-harm past 2 weeks 0   PHQ-9 Total Score 6   Difficulty at work, home, or with people Somewhat difficult     ROBERTO-7  12/23/2019   1. Feeling nervous, anxious, or on edge 1   2. Not being able to stop or control worrying 0   3. Worrying too much about different things 1   4. Trouble relaxing 1   5. Being so restless that it is hard to sit still 1   6. Becoming easily annoyed or irritable 0   7.  Feeling afraid, as if something awful might happen 2   ROBERTO-7 Total Score 6   If you checked any problems, how difficult have they made it for you to do your work, take care of things at home, or get along with other people? Somewhat difficult         Suicide Assessment Five-step Evaluation and Treatment (SAFE-T)      How many servings of fruits and vegetables do you eat daily?  0-1    On average, how many sweetened beverages do you drink each day (Examples: soda, juice, sweet tea, etc.  Do NOT count diet or artificially sweetened beverages)?   0  How many days per week do you miss taking your medication? 1    What makes it hard for you to take your medications?  remembering to take        Patient Active Problem List   Diagnosis     Eczema     Acne     Fifth Metatarsal Bone Styloid/avulsion fracture     Ankle sprain     Deliberate self-cutting     Insomnia     Health Care Home     24 hour contact given to patients monther     Moderate major depression (H)     Common wart     JIMBO (obstructive sleep apnea)     Panic attack     Closed displaced fracture of neck of fifth metacarpal bone of right hand     Vitamin D deficiency     Obesity (BMI 35.0-39.9) with comorbidity (H)     Generalized anxiety disorder     Past Surgical History:   Procedure Laterality Date     NO HISTORY OF SURGERY         Social History     Tobacco Use     Smoking status: Former Smoker     Packs/day: 0.00     Years: 0.00     Pack years: 0.00     Types: Cigarettes     Smokeless tobacco: Former User     Types: Snuff   Substance Use Topics     Alcohol use: No     Family History   Problem Relation Age of Onset     Hypertension Maternal Grandmother      Hypertension Maternal Grandfather      Family History Negative Mother      Family History Negative Father      Family History Negative Paternal Grandmother      Heart Disease Paternal Grandfather         heart disease     Diabetes No family hx of      Cerebrovascular Disease No family hx of      Breast  Cancer No family hx of      Cancer - colorectal No family hx of      Prostate Cancer No family hx of      C.A.D. No family hx of          Current Outpatient Medications   Medication Sig Dispense Refill     escitalopram (LEXAPRO) 20 MG tablet TAKE ONE TABLET BY MOUTH ONCE DAILY 90 tablet 1     zolpidem (AMBIEN) 5 MG tablet Take 1 tablet (5 mg) by mouth nightly as needed for sleep (take and go directly to sleep for at least 7 hours) 30 tablet 3     No Known Allergies  Recent Labs   Lab Test 03/28/18  1729 10/05/15  0534 09/23/13  1529 03/09/13  1440 01/14/13  1606 02/25/12  0734   A1C  --   --  5.2  --  5.3  --    LDL  --   --   --   --   --  128   HDL  --   --   --   --   --  42   TRIG  --   --   --   --   --  114   ALT 19 23  --  36 34 15   CR 0.92 0.80  --   --  0.93 0.90   GFRESTIMATED >90 >90  Non African American GFR Calc    --   --  >90 GFR not calculated, patient <16 years old.   GFRESTBLACK >90 >90  African American GFR Calc    --   --  >90 GFR not calculated, patient <16 years old.   POTASSIUM 4.0 3.4  --   --  4.2 4.4   TSH 1.11  --   --   --  1.27 1.80      BP Readings from Last 3 Encounters:   12/23/19 133/77   04/12/19 132/70   03/04/19 146/79    Wt Readings from Last 3 Encounters:   12/23/19 112.9 kg (249 lb)   04/12/19 117.7 kg (259 lb 6.4 oz)   03/04/19 117.8 kg (259 lb 12.8 oz)                    Reviewed and updated as needed this visit by Provider  Tobacco  Allergies  Meds  Problems  Med Hx  Surg Hx  Fam Hx         Review of Systems   ROS COMP: Constitutional, HEENT, cardiovascular, pulmonary, GI, , musculoskeletal, neuro, skin, endocrine and psych systems are negative, except as otherwise noted.      Objective    /77   Pulse 74   Temp 97.9  F (36.6  C) (Oral)   Resp 16   Wt 112.9 kg (249 lb)   SpO2 98%   BMI 33.77 kg/m    Body mass index is 33.77 kg/m .     Physical Exam   GENERAL: healthy, alert and no distress  NECK:  thyroid normal to palpation  RESP: lungs clear to  auscultation - no rales, rhonchi or wheezes  CV: regular rate and rhythm, normal S1 S2, no S3 or S4, no murmur, click or rub, no peripheral edema and peripheral pulses strong  PSYCH: mentation appears normal, affect normal/bright    Diagnostic Test Results:  Labs reviewed in Epic  See orders        Assessment & Plan       ICD-10-CM    1. Generalized anxiety disorder F41.1 escitalopram (LEXAPRO) 20 MG tablet   2. Moderate major depression (H) F32.1 escitalopram (LEXAPRO) 20 MG tablet   3. Insomnia, unspecified type G47.00 zolpidem (AMBIEN) 5 MG tablet        BMI:   Estimated body mass index is 33.77 kg/m  as calculated from the following:    Height as of 3/4/19: 1.829 m (6').    Weight as of this encounter: 112.9 kg (249 lb).   Weight management plan: Discussed healthy diet and exercise guidelines        See Patient Instructions    Return in about 6 months (around 6/23/2020), or if symptoms worsen or fail to improve.    Ana Langston, ROBER  [facility]

## 2019-12-18 NOTE — PATIENT INSTRUCTIONS
Reminders:     Please remember to arrive 5-10 minutes early for your appointments. If you are late you may need to reschedule your appointment.    If you have mychart please be aware your results and communications will be sent within your mychart. Unless results are critical and/or urgent value.

## 2019-12-19 DIAGNOSIS — F41.1 GENERALIZED ANXIETY DISORDER: ICD-10-CM

## 2019-12-19 DIAGNOSIS — F32.1 MODERATE MAJOR DEPRESSION (H): ICD-10-CM

## 2019-12-19 NOTE — TELEPHONE ENCOUNTER
"ESCITALOPRAM OXALATE 20MG TABS  Last Written Prescription Date:  10/28/2019  Last Fill Quantity: 30,  # refills: 0   Last office visit: 4/12/2019 with prescribing provider:  BLANCA   Future Office Visit:   Next 5 appointments (look out 90 days)    Dec 23, 2019  2:40 PM CST  Office Visit with Ana Langston NP  [facility] ([facility]) [address] [facility] [address] [phone]         Requested Prescriptions   Pending Prescriptions Disp Refills     escitalopram (LEXAPRO) 20 MG tablet [Pharmacy Med Name: ESCITALOPRAM OXALATE 20MG TABS] 30 tablet 0     Sig: TAKE ONE TABLET BY MOUTH ONCE DAILY (NEED TO BE SEEN IN CLINIC FOR FURTHER REFILLS)       SSRIs Protocol Failed - 12/19/2019  3:58 PM        Failed - PHQ-9 score less than 5 in past 6 months     Please review last PHQ-9 score.           Passed - Medication is active on med list        Passed - Patient is age 18 or older        Passed - Recent (6 mo) or future (30 days) visit within the authorizing provider's specialty     Patient had office visit in the last 6 months or has a visit in the next 30 days with authorizing provider or within the authorizing provider's specialty.  See \"Patient Info\" tab in inbasket, or \"Choose Columns\" in Meds & Orders section of the refill encounter.              "

## 2019-12-20 NOTE — TELEPHONE ENCOUNTER
Patient has appointment with provider on 12/23/19.    PHQ-9 score:    PHQ-9 SCORE 10/24/2019   PHQ-9 Total Score -   PHQ-9 Total Score MyChart -   PHQ-9 Total Score 5       Routing to provider to advise.     Michelle Berg, RN, BSN, PHN

## 2019-12-21 RX ORDER — ESCITALOPRAM OXALATE 20 MG/1
TABLET ORAL
Qty: 30 TABLET | Refills: 0 | Status: SHIPPED | OUTPATIENT
Start: 2019-12-21 | End: 2019-12-23

## 2019-12-23 ENCOUNTER — OFFICE VISIT (OUTPATIENT)
Dept: FAMILY MEDICINE | Facility: CLINIC | Age: 24
End: 2019-12-23
Payer: COMMERCIAL

## 2019-12-23 VITALS
DIASTOLIC BLOOD PRESSURE: 77 MMHG | BODY MASS INDEX: 33.77 KG/M2 | RESPIRATION RATE: 16 BRPM | HEART RATE: 74 BPM | WEIGHT: 249 LBS | SYSTOLIC BLOOD PRESSURE: 133 MMHG | TEMPERATURE: 97.9 F | OXYGEN SATURATION: 98 %

## 2019-12-23 DIAGNOSIS — F41.1 GENERALIZED ANXIETY DISORDER: Primary | ICD-10-CM

## 2019-12-23 DIAGNOSIS — G47.00 INSOMNIA, UNSPECIFIED TYPE: ICD-10-CM

## 2019-12-23 DIAGNOSIS — F32.1 MODERATE MAJOR DEPRESSION (H): ICD-10-CM

## 2019-12-23 PROCEDURE — 90715 TDAP VACCINE 7 YRS/> IM: CPT | Performed by: NURSE PRACTITIONER

## 2019-12-23 PROCEDURE — 99214 OFFICE O/P EST MOD 30 MIN: CPT | Mod: 25 | Performed by: NURSE PRACTITIONER

## 2019-12-23 PROCEDURE — 90471 IMMUNIZATION ADMIN: CPT | Performed by: NURSE PRACTITIONER

## 2019-12-23 PROCEDURE — 96127 BRIEF EMOTIONAL/BEHAV ASSMT: CPT | Performed by: NURSE PRACTITIONER

## 2019-12-23 RX ORDER — ZOLPIDEM TARTRATE 5 MG/1
5 TABLET ORAL
Qty: 30 TABLET | Refills: 3 | Status: SHIPPED | OUTPATIENT
Start: 2019-12-23 | End: 2020-05-19

## 2019-12-23 RX ORDER — ESCITALOPRAM OXALATE 20 MG/1
TABLET ORAL
Qty: 90 TABLET | Refills: 1 | Status: SHIPPED | OUTPATIENT
Start: 2019-12-23 | End: 2020-05-17

## 2019-12-23 ASSESSMENT — ANXIETY QUESTIONNAIRES
6. BECOMING EASILY ANNOYED OR IRRITABLE: NOT AT ALL
IF YOU CHECKED OFF ANY PROBLEMS ON THIS QUESTIONNAIRE, HOW DIFFICULT HAVE THESE PROBLEMS MADE IT FOR YOU TO DO YOUR WORK, TAKE CARE OF THINGS AT HOME, OR GET ALONG WITH OTHER PEOPLE: SOMEWHAT DIFFICULT
1. FEELING NERVOUS, ANXIOUS, OR ON EDGE: SEVERAL DAYS
2. NOT BEING ABLE TO STOP OR CONTROL WORRYING: NOT AT ALL
7. FEELING AFRAID AS IF SOMETHING AWFUL MIGHT HAPPEN: MORE THAN HALF THE DAYS
GAD7 TOTAL SCORE: 6
5. BEING SO RESTLESS THAT IT IS HARD TO SIT STILL: SEVERAL DAYS
3. WORRYING TOO MUCH ABOUT DIFFERENT THINGS: SEVERAL DAYS

## 2019-12-23 ASSESSMENT — PATIENT HEALTH QUESTIONNAIRE - PHQ9
5. POOR APPETITE OR OVEREATING: SEVERAL DAYS
SUM OF ALL RESPONSES TO PHQ QUESTIONS 1-9: 6

## 2019-12-23 NOTE — NURSING NOTE
Screening Questionnaire for Adult Immunization    Are you sick today?   No   Do you have allergies to medications, food, a vaccine component or latex?   No   Have you ever had a serious reaction after receiving a vaccination?   No   Do you have a long-term health problem with heart disease, lung disease, asthma, kidney disease, metabolic disease (e.g. diabetes), anemia, or other blood disorder?   No   Do you have cancer, leukemia, HIV/AIDS, or any other immune system problem?   No   In the past 3 months, have you taken medications that affect  your immune system, such as prednisone, other steroids, or anticancer drugs; drugs for the treatment of rheumatoid arthritis, Crohn s disease, or psoriasis; or have you had radiation treatments?   No   Have you had a seizure, or a brain or other nervous system problem?   No   During the past year, have you received a transfusion of blood or blood     products, or been given immune (gamma) globulin or antiviral drug?   No   For women: Are you pregnant or is there a chance you could become        pregnant during the next month?   No   Have you received any vaccinations in the past 4 weeks?   No     Immunization questionnaire answers were all negative.        Per orders of ALICE Martinez, FNP-BC , injection of adacel given by Sharda Bui. Patient instructed to remain in clinic for 15 minutes afterwards, and to report any adverse reaction to me immediately.       Screening performed by Sharda Bui on 12/23/2019 at 3:00 PM.

## 2019-12-24 ASSESSMENT — ANXIETY QUESTIONNAIRES: GAD7 TOTAL SCORE: 6

## 2020-05-16 DIAGNOSIS — G47.00 INSOMNIA, UNSPECIFIED TYPE: ICD-10-CM

## 2020-05-18 NOTE — TELEPHONE ENCOUNTER
Routing refill request to provider for review/approval because:  Drug not on the [facility] refill protocol     Last Written Prescription Date:  12-23-19  Last Fill Quantity: 30,  # refills: 3   Last office visit: 12/23/2019 with prescribing provider:  Ana Langston   Future Office Visit:  none

## 2020-05-19 ENCOUNTER — MYC REFILL (OUTPATIENT)
Dept: FAMILY MEDICINE | Facility: CLINIC | Age: 25
End: 2020-05-19

## 2020-05-19 DIAGNOSIS — G47.00 INSOMNIA, UNSPECIFIED TYPE: ICD-10-CM

## 2020-05-19 RX ORDER — ZOLPIDEM TARTRATE 5 MG/1
TABLET ORAL
Qty: 30 TABLET | Refills: 3 | Status: SHIPPED | OUTPATIENT
Start: 2020-05-19 | End: 2020-10-09

## 2020-05-19 RX ORDER — ZOLPIDEM TARTRATE 5 MG/1
5 TABLET ORAL
Qty: 30 TABLET | Refills: 3 | Status: CANCELLED | OUTPATIENT
Start: 2020-05-19

## 2020-08-20 ENCOUNTER — VIRTUAL VISIT (OUTPATIENT)
Dept: FAMILY MEDICINE | Facility: CLINIC | Age: 25
End: 2020-08-20
Payer: COMMERCIAL

## 2020-08-20 DIAGNOSIS — F32.1 MODERATE MAJOR DEPRESSION (H): ICD-10-CM

## 2020-08-20 DIAGNOSIS — F41.1 GENERALIZED ANXIETY DISORDER: ICD-10-CM

## 2020-08-20 PROCEDURE — 99214 OFFICE O/P EST MOD 30 MIN: CPT | Mod: TEL | Performed by: NURSE PRACTITIONER

## 2020-08-20 RX ORDER — ESCITALOPRAM OXALATE 20 MG/1
TABLET ORAL
Qty: 90 TABLET | Refills: 1 | Status: SHIPPED | OUTPATIENT
Start: 2020-08-20 | End: 2021-03-26

## 2020-08-20 ASSESSMENT — ANXIETY QUESTIONNAIRES
7. FEELING AFRAID AS IF SOMETHING AWFUL MIGHT HAPPEN: NOT AT ALL
GAD7 TOTAL SCORE: 6
6. BECOMING EASILY ANNOYED OR IRRITABLE: NOT AT ALL
3. WORRYING TOO MUCH ABOUT DIFFERENT THINGS: SEVERAL DAYS
IF YOU CHECKED OFF ANY PROBLEMS ON THIS QUESTIONNAIRE, HOW DIFFICULT HAVE THESE PROBLEMS MADE IT FOR YOU TO DO YOUR WORK, TAKE CARE OF THINGS AT HOME, OR GET ALONG WITH OTHER PEOPLE: SOMEWHAT DIFFICULT
1. FEELING NERVOUS, ANXIOUS, OR ON EDGE: NEARLY EVERY DAY
5. BEING SO RESTLESS THAT IT IS HARD TO SIT STILL: NOT AT ALL
2. NOT BEING ABLE TO STOP OR CONTROL WORRYING: SEVERAL DAYS

## 2020-08-20 ASSESSMENT — PATIENT HEALTH QUESTIONNAIRE - PHQ9
SUM OF ALL RESPONSES TO PHQ QUESTIONS 1-9: 4
5. POOR APPETITE OR OVEREATING: SEVERAL DAYS

## 2020-08-20 NOTE — PROGRESS NOTES
"Mario Raines is a 24 year old male who is being evaluated via a billable telephone visit.      The patient has been notified of following:     \"This telephone visit will be conducted via a call between you and your physician/provider. We have found that certain health care needs can be provided without the need for a physical exam.  This service lets us provide the care you need with a short phone conversation.  If a prescription is necessary we can send it directly to your pharmacy.  If lab work is needed we can place an order for that and you can then stop by our lab to have the test done at a later time.    Telephone visits are billed at different rates depending on your insurance coverage. During this emergency period, for some insurers they may be billed the same as an in-person visit.  Please reach out to your insurance provider with any questions.    If during the course of the call the physician/provider feels a telephone visit is not appropriate, you will not be charged for this service.\"    Patient has given verbal consent for Telephone visit?  Yes    What phone number would you like to be contacted at? [phone]    How would you like to obtain your AVS? Emory Abreu     Mario Raines is a 24 year old male who presents via phone visit today for the following health issues:    HPI    Depression and Anxiety Follow-Up    How are you doing with your depression since your last visit? Improved     How are you doing with your anxiety since your last visit?  Improved     Are you having other symptoms that might be associated with depression or anxiety? No    Have you had a significant life event? No     Do you have any concerns with your use of alcohol or other drugs? No    Social History     Tobacco Use     Smoking status: Former Smoker     Packs/day: 0.00     Years: 0.00     Pack years: 0.00     Types: Cigarettes     Smokeless tobacco: Former User     Types: Snuff   Substance Use Topics     Alcohol use: " No     Drug use: No     Comment: past-marijuana and meth     PHQ 10/24/2019 12/23/2019 8/20/2020   PHQ-9 Total Score 5 6 4   Q9: Thoughts of better off dead/self-harm past 2 weeks Not at all Not at all Not at all     ROBERTO-7 SCORE 10/24/2019 12/23/2019 8/20/2020   Total Score - - -   Total Score - - -   Total Score 7 6 6         Suicide Assessment Five-step Evaluation and Treatment (SAFE-T)      How many servings of fruits and vegetables do you eat daily?  2-3    On average, how many sweetened beverages do you drink each day (Examples: soda, juice, sweet tea, etc.  Do NOT count diet or artificially sweetened beverages)?   1    How many days per week do you exercise enough to make your heart beat faster? 3 or less    How many minutes a day do you exercise enough to make your heart beat faster? 9 or less    How many days per week do you miss taking your medication? 0         Review of Systems   Constitutional, HEENT, cardiovascular, pulmonary, GI, , musculoskeletal, neuro, skin, endocrine and psych systems are negative, except as otherwise noted.       Objective          Vitals:  No vitals were obtained today due to virtual visit.    healthy, alert and no distress  PSYCH: Alert and oriented times 3; coherent speech, normal   rate and volume, able to articulate logical thoughts, able   to abstract reason, no tangential thoughts, no hallucinations   or delusions  His affect is normal  RESP: No cough, no audible wheezing, able to talk in full sentences  Remainder of exam unable to be completed due to telephone visits          Assessment/Plan:    Assessment & Plan     (F41.1) Generalized anxiety disorder    Plan: escitalopram (LEXAPRO) 20 MG tablet           (F32.1) Moderate major depression (H)    Plan: escitalopram (LEXAPRO) 20 MG tablet                 See Patient Instructions    Return in about 6 months (around 2/20/2021), or if symptoms worsen or fail to improve.    Ana Langston, ROBER  [facility]  ABRAHAM    Phone call duration:  12 minutes

## 2020-08-20 NOTE — PATIENT INSTRUCTIONS
Patient Education     Your Body s Response to Anxiety    Normal anxiety is part of the body s natural defense system. It's an alert to a threat that is unknown, vague, or comes from your own internal fears. While you re in this state, your feelings can range from a vague sense of worry to physical sensations such as a pounding heartbeat. These feelings make you want to react to the threat. An anxiety response is normal in many situations. But when you have an anxiety disorder, the same response can occur at the wrong times.  Anxiety can be helpful  Normal anxiety is a signal from your brain that warns you of a threat and is a normal response to help you prevent something or decrease the bad effects of something you can't control. For example, anxiety is a normal response to situations that might damage your body, separate you from a loved one, or lose your job. The symptoms of anxiety can be physical and mental.  How does it feel?  At certain times, people with anxiety may have:    Dizziness    Muscle tension or pain    Restlessness    Sleeplessness    Trouble concentrating    Racing heartbeat    Fast breathing    Shaking or trembling    Stomachache    Diarrhea    Loss of energy    Sweating    Cold, clammy hands    Chest pain    Dry mouth  Anxiety can also be a problem  Anxiety can become a problem when it is hard to control, occurs for months, and interferes with important parts of your life. With an anxiety disorder, your body has the response described above, but in inappropriate ways. The response a person has depends on the anxiety disorder he or she has. With some disorders, the anxiety is way out of proportion to the threat that triggers it. With others, anxiety may occur even when there isn t a clear threat or trigger.  Who does it affect?  Some people are more prone to persistent anxiety than others. It tends to run in families, and it affects more younger people than older people, and more women than  "men. But no age, race, or gender is immune to anxiety problems.  Anxiety can be treated  The good news is that the anxiety that s disrupting your life can be treated. Check with your healthcare provider and rule out any physical problems that may be causing the anxiety symptoms. If an anxiety disorder is diagnosed seek mental healthcare. This is an illness and it can respond to treatment. Most types of anxiety disorders will respond to \"talk therapy\" and medicines. Working with your doctor or other healthcare provider, you can develop skills to help you cope with anxiety. You can also gain the perspective you need to overcome your fears. Note: Good sources of support or guidance can be found at your local hospital, mental health clinic, or an employee assistance program.  How to cope with anxiety  If anxiety is wearing you down, here are some things you can do to cope:    Keep in mind that you can t control everything about a situation. Change what you can and let the rest take its course.    Exercise--it s a great way to relieve tension and help your body feel relaxed.    Avoid caffeine and nicotine, which can make anxiety symptoms worse.    Fight the temptation to turn to alcohol or unprescribed drugs for relief. They only make things worse in the long run.    Educate yourself about anxiety disorders. Keep track of helpful online resources and books you can use during stressful periods.    Try stress management techniques such as meditation.    Consider online or in-person support groups.   Date Last Reviewed: 1/1/2017 2000-2019 [employer]. [address] [place] [address], [place] [address]. All rights reserved. This information is not intended as a substitute for professional medical care. Always follow your healthcare professional's instructions.           "

## 2020-08-21 ASSESSMENT — ANXIETY QUESTIONNAIRES: GAD7 TOTAL SCORE: 6

## 2020-09-29 DIAGNOSIS — G47.00 INSOMNIA, UNSPECIFIED TYPE: ICD-10-CM

## 2020-09-29 NOTE — TELEPHONE ENCOUNTER
Routing refill request to provider for review/approval because:  Drug not on the [facility] refill protocol     Glendy ROSSN, RN

## 2020-09-30 RX ORDER — ZOLPIDEM TARTRATE 5 MG/1
TABLET ORAL
Qty: 30 TABLET | Refills: 3 | OUTPATIENT
Start: 2020-09-30

## 2020-10-01 ENCOUNTER — MYC MEDICAL ADVICE (OUTPATIENT)
Dept: FAMILY MEDICINE | Facility: CLINIC | Age: 25
End: 2020-10-01

## 2020-11-16 ENCOUNTER — HEALTH MAINTENANCE LETTER (OUTPATIENT)
Age: 25
End: 2020-11-16

## 2020-11-23 DIAGNOSIS — G47.00 INSOMNIA, UNSPECIFIED TYPE: ICD-10-CM

## 2020-11-23 RX ORDER — ZOLPIDEM TARTRATE 5 MG/1
TABLET ORAL
Qty: 30 TABLET | Refills: 0 | Status: SHIPPED | OUTPATIENT
Start: 2020-11-23 | End: 2021-01-29

## 2020-11-23 NOTE — TELEPHONE ENCOUNTER
Routing refill request to provider for review/approval because:  Drug not on the [facility] refill protocol     Ana ROSSN, RN, CPN

## 2021-03-24 DIAGNOSIS — F41.1 GENERALIZED ANXIETY DISORDER: ICD-10-CM

## 2021-03-24 DIAGNOSIS — F32.1 MODERATE MAJOR DEPRESSION (H): ICD-10-CM

## 2021-03-24 DIAGNOSIS — G47.00 INSOMNIA, UNSPECIFIED TYPE: ICD-10-CM

## 2021-03-26 RX ORDER — ESCITALOPRAM OXALATE 20 MG/1
TABLET ORAL
Qty: 30 TABLET | Refills: 0 | Status: SHIPPED | OUTPATIENT
Start: 2021-03-26 | End: 2021-05-02

## 2021-03-26 NOTE — TELEPHONE ENCOUNTER
See plan:    08/20/2020 Ana Langston NP Virtual Visit  Return in about 6 months (around 2/20/2021), or if symptoms worsen or fail to improve.     Nothing yet scheduled.    PHQ-9 score:    PHQ 8/20/2020   PHQ-9 Total Score 4   Q9: Thoughts of better off dead/self-harm past 2 weeks Not at all         Escitalopram:    Medication is being filled for 1 time refill only due to:  Patient needs to be seen because due per plan.     Attempted to call patient at home number, no answer, left message on voicemail; patient was instructed to return call to [facility] at [phone].      Ambien:    Routing refill request to provider for review/approval because:  Drug not on the [facility] refill protocol       Lanny Thomas, RN  [facility]

## 2021-03-29 RX ORDER — ZOLPIDEM TARTRATE 5 MG/1
TABLET ORAL
Qty: 30 TABLET | Refills: 0 | Status: SHIPPED | OUTPATIENT
Start: 2021-03-29 | End: 2021-05-02

## 2021-05-04 ENCOUNTER — MYC REFILL (OUTPATIENT)
Dept: FAMILY MEDICINE | Facility: CLINIC | Age: 26
End: 2021-05-04

## 2021-05-04 DIAGNOSIS — F41.1 GENERALIZED ANXIETY DISORDER: ICD-10-CM

## 2021-05-04 DIAGNOSIS — G47.00 INSOMNIA, UNSPECIFIED TYPE: ICD-10-CM

## 2021-05-04 DIAGNOSIS — F32.1 MODERATE MAJOR DEPRESSION (H): ICD-10-CM

## 2021-05-04 RX ORDER — ESCITALOPRAM OXALATE 20 MG/1
20 TABLET ORAL DAILY
Qty: 30 TABLET | Refills: 0 | Status: CANCELLED | OUTPATIENT
Start: 2021-05-04

## 2021-05-04 RX ORDER — ZOLPIDEM TARTRATE 5 MG/1
TABLET ORAL
Qty: 30 TABLET | Refills: 0 | Status: CANCELLED | OUTPATIENT
Start: 2021-05-04

## 2021-05-07 ENCOUNTER — OFFICE VISIT (OUTPATIENT)
Dept: FAMILY MEDICINE | Facility: CLINIC | Age: 26
End: 2021-05-07
Payer: COMMERCIAL

## 2021-05-07 VITALS
WEIGHT: 227.4 LBS | OXYGEN SATURATION: 98 % | TEMPERATURE: 98.1 F | RESPIRATION RATE: 20 BRPM | HEART RATE: 69 BPM | SYSTOLIC BLOOD PRESSURE: 122 MMHG | BODY MASS INDEX: 30.84 KG/M2 | DIASTOLIC BLOOD PRESSURE: 75 MMHG

## 2021-05-07 DIAGNOSIS — F41.1 GENERALIZED ANXIETY DISORDER: ICD-10-CM

## 2021-05-07 DIAGNOSIS — F32.1 MODERATE MAJOR DEPRESSION (H): ICD-10-CM

## 2021-05-07 DIAGNOSIS — F41.0 PANIC ATTACK: Primary | ICD-10-CM

## 2021-05-07 DIAGNOSIS — G47.00 INSOMNIA, UNSPECIFIED TYPE: ICD-10-CM

## 2021-05-07 PROCEDURE — 99214 OFFICE O/P EST MOD 30 MIN: CPT | Performed by: NURSE PRACTITIONER

## 2021-05-07 RX ORDER — ZOLPIDEM TARTRATE 5 MG/1
TABLET ORAL
Qty: 30 TABLET | Refills: 5 | Status: SHIPPED | OUTPATIENT
Start: 2021-05-07 | End: 2021-12-01

## 2021-05-07 RX ORDER — CLONAZEPAM 0.5 MG/1
0.5 TABLET ORAL 2 TIMES DAILY PRN
Qty: 20 TABLET | Refills: 3 | Status: SHIPPED | OUTPATIENT
Start: 2021-05-07 | End: 2023-11-30

## 2021-05-07 RX ORDER — ESCITALOPRAM OXALATE 20 MG/1
20 TABLET ORAL DAILY
Qty: 90 TABLET | Refills: 3 | Status: SHIPPED | OUTPATIENT
Start: 2021-05-07 | End: 2023-11-30

## 2021-05-07 ASSESSMENT — ANXIETY QUESTIONNAIRES
2. NOT BEING ABLE TO STOP OR CONTROL WORRYING: SEVERAL DAYS
IF YOU CHECKED OFF ANY PROBLEMS ON THIS QUESTIONNAIRE, HOW DIFFICULT HAVE THESE PROBLEMS MADE IT FOR YOU TO DO YOUR WORK, TAKE CARE OF THINGS AT HOME, OR GET ALONG WITH OTHER PEOPLE: VERY DIFFICULT
6. BECOMING EASILY ANNOYED OR IRRITABLE: NOT AT ALL
GAD7 TOTAL SCORE: 6
7. FEELING AFRAID AS IF SOMETHING AWFUL MIGHT HAPPEN: SEVERAL DAYS
5. BEING SO RESTLESS THAT IT IS HARD TO SIT STILL: SEVERAL DAYS
1. FEELING NERVOUS, ANXIOUS, OR ON EDGE: SEVERAL DAYS
3. WORRYING TOO MUCH ABOUT DIFFERENT THINGS: SEVERAL DAYS

## 2021-05-07 ASSESSMENT — PATIENT HEALTH QUESTIONNAIRE - PHQ9
SUM OF ALL RESPONSES TO PHQ QUESTIONS 1-9: 3
5. POOR APPETITE OR OVEREATING: SEVERAL DAYS

## 2021-05-07 NOTE — PROGRESS NOTES
Assessment & Plan     Generalized anxiety disorder    - escitalopram (LEXAPRO) 20 MG tablet; Take 1 tablet (20 mg) by mouth daily    Moderate major depression (H)    - escitalopram (LEXAPRO) 20 MG tablet; Take 1 tablet (20 mg) by mouth daily    Insomnia, unspecified type    - zolpidem (AMBIEN) 5 MG tablet; TAKE ONE TABLET BY MOUTH AT BEDTIME AS NEEDED FOR SLEEP (TAKE AND GO DIRECTLY TO SLEEP FOR AT LEAST 7 HOURS) DUE FOR FOLLOW UP    Panic attack    - clonazePAM (KLONOPIN) 0.5 MG tablet; Take 1 tablet (0.5 mg) by mouth 2 times daily as needed (for panic attack only)    20 minutes spent on the date of the encounter doing chart review, history and exam, documentation and further activities per the note     See Patient Instructions: discussed follow up in 6 months; sooner if he has worsening symptoms or other healthcare questions/concerns.  Pt in agreement.     Return in about 6 months (around 11/7/2021), or if symptoms worsen or fail to improve.    SIDDHARTH Deal  [facility] ABRAHAM Martin is a 25 year old who presents for the following health issues     HPI     Depression and Anxiety Follow-Up    How are you doing with your depression since your last visit? Improved - depression has improved.  Anxiety is different- less anxious, but when he has a panic attack, they are more severe. Panic attack occur 1-2 times per month.  Working at [employer]- likes his job and gets a discount to eat better which he has been doing.     How are you doing with your anxiety since your last visit?  different    Are you having other symptoms that might be associated with depression or anxiety? Yes:  less than daily anxiety, but when it happens it is more like a panic attack    Have you had a significant life event? Relationship Concerns     Do you have any concerns with your use of alcohol or other drugs? No    Social History     Tobacco Use     Smoking status: Former Smoker     Packs/day: 0.00      Years: 0.00     Pack years: 0.00     Types: Cigarettes     Smokeless tobacco: Former User     Types: Snuff   Substance Use Topics     Alcohol use: Yes     Comment: occasional     Drug use: No     Comment: past-marijuana and meth     PHQ 12/23/2019 8/20/2020 5/7/2021   PHQ-9 Total Score 6 4 3   Q9: Thoughts of better off dead/self-harm past 2 weeks Not at all Not at all Not at all     ROBERTO-7 SCORE 12/23/2019 8/20/2020 5/7/2021   Total Score - - -   Total Score - - -   Total Score 6 6 6     Last PHQ-9 5/7/2021   1.  Little interest or pleasure in doing things 0   2.  Feeling down, depressed, or hopeless 0   3.  Trouble falling or staying asleep, or sleeping too much 2   4.  Feeling tired or having little energy 0   5.  Poor appetite or overeating 0   6.  Feeling bad about yourself 1   7.  Trouble concentrating 0   8.  Moving slowly or restless 0   Q9: Thoughts of better off dead/self-harm past 2 weeks 0   PHQ-9 Total Score 3   Difficulty at work, home, or with people Somewhat difficult     ROBERTO-7  5/7/2021   1. Feeling nervous, anxious, or on edge 1   2. Not being able to stop or control worrying 1   3. Worrying too much about different things 1   4. Trouble relaxing 1   5. Being so restless that it is hard to sit still 1   6. Becoming easily annoyed or irritable 0   7. Feeling afraid, as if something awful might happen 1   ROBERTO-7 Total Score 6   If you checked any problems, how difficult have they made it for you to do your work, take care of things at home, or get along with other people? Very difficult       Suicide Assessment Five-step Evaluation and Treatment (SAFE-T)      How many servings of fruits and vegetables do you eat daily?  4 or more    On average, how many sweetened beverages do you drink each day (Examples: soda, juice, sweet tea, etc.  Do NOT count diet or artificially sweetened beverages)?   0    How many days per week do you exercise enough to make your heart beat faster? 3 or less    How many minutes  a day do you exercise enough to make your heart beat faster? 30 - 60  How many days per week do you miss taking your medication? 1    What makes it hard for you to take your medications?        Review of Systems   Constitutional, HEENT, cardiovascular, pulmonary, GI, , musculoskeletal, neuro, skin, endocrine and psych systems are negative, except as otherwise noted.      Objective    /75   Pulse 69   Temp 98.1  F (36.7  C) (Tympanic)   Resp 20   Wt 103.1 kg (227 lb 6.4 oz)   SpO2 98%   BMI 30.84 kg/m    Body mass index is 30.84 kg/m .  Physical Exam   GENERAL: Healthy, alert and no distress  EYES: Eyes grossly normal to inspection.  No discharge or erythema, or obvious scleral/conjunctival abnormalities.  RESP: No audible wheeze, cough, or visible cyanosis.  No visible retractions or increased work of breathing.    SKIN: Visible skin clear. No significant rash, abnormal pigmentation or lesions.  NEURO: Cranial nerves grossly intact.  Mentation and speech appropriate for age.  PSYCH: Mentation appears normal, affect normal/bright, judgement and insight intact, normal speech and appearance well-groomed.    See orders

## 2021-05-07 NOTE — PATIENT INSTRUCTIONS
Patient Education   Please make an appointment to follow up with your therapist and/or Psychiatric Clinic (phone: [phone]) within 1-3 days.     Return to the ED if you are having worsening thoughts/feelings of harming yourself or others, or any urgent/life-threatening concerns.     DISCHARGE RESOURCES:    [facility] [phone]     [facility] [phone] (detox), [phone] (outpatient & [facility])      Crisis Intervention: [phone] or [phone] (TTY: 877.323.3033).  Call anytime.    Suicide Awareness Voices of Education (SAVE) (www.save.org): 051-464-PPXG (0581)    National Suicide Prevention Line (www.mentalhealthmn.org): 718-386-HRSQ (2612)    National [place] [employer] Mental Illness (www.[place].org): 316.724.6466 or 355-429-8993.    Hjny5dpfb: text the word LIFE to 19339 for immediate support and crisis intervention    Mental Health Consumer/Survivor Network of [place] (www.mhcsn.net): 499.103.1723 or 063-512-5232    Mental [place] Association [place] (www.mentalhealth.org): 876.267.1679 or 808-204-4640

## 2021-05-08 ASSESSMENT — ANXIETY QUESTIONNAIRES: GAD7 TOTAL SCORE: 6

## 2021-06-07 DIAGNOSIS — F41.1 GENERALIZED ANXIETY DISORDER: ICD-10-CM

## 2021-06-07 DIAGNOSIS — F32.1 MODERATE MAJOR DEPRESSION (H): ICD-10-CM

## 2021-06-08 RX ORDER — ESCITALOPRAM OXALATE 20 MG/1
TABLET ORAL
Qty: 30 TABLET | Refills: 0 | OUTPATIENT
Start: 2021-06-08

## 2021-08-06 ENCOUNTER — OFFICE VISIT (OUTPATIENT)
Dept: FAMILY MEDICINE | Facility: CLINIC | Age: 26
End: 2021-08-06
Payer: COMMERCIAL

## 2021-08-06 VITALS
DIASTOLIC BLOOD PRESSURE: 82 MMHG | HEART RATE: 70 BPM | SYSTOLIC BLOOD PRESSURE: 128 MMHG | OXYGEN SATURATION: 97 % | TEMPERATURE: 98.5 F | WEIGHT: 215 LBS | BODY MASS INDEX: 28.49 KG/M2 | HEIGHT: 73 IN

## 2021-08-06 DIAGNOSIS — B07.8 COMMON WART: Primary | ICD-10-CM

## 2021-08-06 PROCEDURE — 17110 DESTRUCTION B9 LES UP TO 14: CPT | Performed by: PHYSICIAN ASSISTANT

## 2021-08-06 ASSESSMENT — ENCOUNTER SYMPTOMS
SHORTNESS OF BREATH: 0
FEVER: 0

## 2021-08-06 ASSESSMENT — MIFFLIN-ST. JEOR: SCORE: 2019.11

## 2021-08-06 NOTE — PROGRESS NOTES
"    Assessment & Plan     Common wart  Patient is a 25-year-old male who presents to clinic for treatment of 2 common warts, one at right palm, and the other at left forearm. Vital signs normal. Discussed risks of cryotherapy including discomfort, blistering, burn as well as benefits including more rapid treatment and hopeful resolution of common warts. Patient is agreeable and would like proceed. Cryotherapy perform successfully. Discussed home management including soaking, debridement, Compound W use, and duct tape application. Patient may return in 2-3 weeks for repeat treatment if desired.    See Patient Instructions    Return in about 2 weeks (around 8/20/2021), or if symptoms worsen or fail to improve.    MICHAEL Musa [facility] ABRAHAM Martin is a 25 year old who presents for the following health issues     HPI     - Wart on left forearm X 2-3 Months. He has been using Compound W on area with some improvement. Also he notes a small wart on right palm which has been present for many years. Patient was using soaking followed by Compound W and then bandaid over the top.     Review of Systems   Constitutional: Negative for fever.   Respiratory: Negative for shortness of breath.    Cardiovascular: Negative for chest pain.   Skin: Negative for rash.            Objective    /82   Pulse 70   Temp 98.5  F (36.9  C) (Tympanic)   Ht 1.854 m (6' 1\")   Wt 97.5 kg (215 lb)   SpO2 97%   BMI 28.37 kg/m    Body mass index is 28.37 kg/m .  Physical Exam  Vitals and nursing note reviewed.   Constitutional:       General: He is not in acute distress.     Appearance: Normal appearance.   HENT:      Head: Normocephalic and atraumatic.   Eyes:      Extraocular Movements: Extraocular movements intact.      Pupils: Pupils are equal, round, and reactive to light.   Cardiovascular:      Rate and Rhythm: Normal rate and regular rhythm.      Heart sounds: Normal heart sounds.   Pulmonary: "      Effort: Pulmonary effort is normal.      Breath sounds: Normal breath sounds.   Musculoskeletal:         General: Normal range of motion.      Cervical back: Normal range of motion.   Skin:     General: Skin is warm and dry.      Comments: Approximately 0.1 cm Common wart at right palm  Approximately 0.75 cm common wart at left forearm   Neurological:      General: No focal deficit present.      Mental Status: He is alert.   Psychiatric:         Mood and Affect: Mood normal.         Behavior: Behavior normal.

## 2021-08-06 NOTE — PATIENT INSTRUCTIONS
Your warts were treated with cryotherapy, freezing, and today. You may noticed some discomfort to the area following treatment. For discomfort you may use Tylenol/ibuprofen. You can have cryotherapy repeated in 2-3 weeks if desired.    For wart treatment at home:  -Soak wart and then take top layer of skin off with pumice stone  -Apply Compound W and let dry  -Cover with duct tape  -Repeat every 24 hours    Please reach out with questions or concerns. Return to clinic for new or worsening symptoms.    Patient Education     Treating Warts     You and your healthcare provider can discuss whether your warts need to be treated.   You and your healthcare provider can talk about what treatment may be best for your wart or warts. To get rid of your warts, you may need to try more than one type of treatment. The methods described below are often used to treat warts.   Types of treatment    Do nothing. Most warts will go away within 2 years, even without treatment. So doing nothing is sometimes a good option. This is particularly true for smaller warts that are not causing symptoms.    Cryotherapy (liquid nitrogen).  This kills skin cells by freezing them. It kills the warts and destroys skin infected by the wart-causing virus. This is done in your healthcare provider s office and will cause some mild pain. It may take several treatments over several weeks to get rid of the warts.    Topical medicines. Prescribed topical medicines can be put on the skin. These are often applied in the healthcare provider's office. But some prescriptions may be applied at home.    Over-the-counter (OTC) topical treatments.  OTC medicines that most often contain salicylic acid may be an option. These patches, liquids, and creams are used at home. The medicine is applied daily to the wart and nearby skin. It's often left on overnight. The dead skin is filed down the next day. In 1 to 3 days, the procedure can be repeated. Topical treatments are  sometimes combined with cryotherapy.    Electrodessication and curettage (ED & C).   For this procedure, the healthcare provider puts numbing medicine on the wart. Then the wart is scraped or cut off. This type of treatment is often not the first line of therapy.    Laser surgery.  This can vaporize wart tissue or destroy the blood vessels that feed the wart. This is done in the provider's office.    Shots (injections). These can be used to treat warts that don t respond to other treatments, such as stubborn or painful warts around the nails. This is done in the provider s office.    When to get medical treatment  It s a good idea to have your healthcare provider check your warts. That way your provider can rule out any other skin problems. Sometimes a callous or a corn can look like a wart. But the treatments may differ. Treatment can also provide relief from warts that bleed, burn, hurt, or itch. Genital warts should always be treated. They can spread to other people through sexual contact. And they may cause genital or cervical cancer.   After having your warts treated, new warts may still appear. Don t be discouraged. Warts often come back. See your healthcare provider again to talk about this. Your provider can tell you about the treatments that most likely will help clear your skin of warts.   Wolf last reviewed this educational content on 7/1/2019 2000-2021 [employer]. All rights reserved. This information is not intended as a substitute for professional medical care. Always follow your healthcare professional's instructions.

## 2021-09-18 ENCOUNTER — HEALTH MAINTENANCE LETTER (OUTPATIENT)
Age: 26
End: 2021-09-18

## 2021-10-21 ENCOUNTER — OFFICE VISIT (OUTPATIENT)
Dept: FAMILY MEDICINE | Facility: CLINIC | Age: 26
End: 2021-10-21
Payer: COMMERCIAL

## 2021-10-21 VITALS
HEIGHT: 74 IN | RESPIRATION RATE: 14 BRPM | DIASTOLIC BLOOD PRESSURE: 66 MMHG | HEART RATE: 59 BPM | OXYGEN SATURATION: 99 % | TEMPERATURE: 98 F | BODY MASS INDEX: 27 KG/M2 | SYSTOLIC BLOOD PRESSURE: 115 MMHG | WEIGHT: 210.38 LBS

## 2021-10-21 DIAGNOSIS — M54.50 ACUTE BILATERAL LOW BACK PAIN WITHOUT SCIATICA: Primary | ICD-10-CM

## 2021-10-21 PROCEDURE — 99214 OFFICE O/P EST MOD 30 MIN: CPT | Performed by: PHYSICIAN ASSISTANT

## 2021-10-21 ASSESSMENT — ANXIETY QUESTIONNAIRES
5. BEING SO RESTLESS THAT IT IS HARD TO SIT STILL: SEVERAL DAYS
1. FEELING NERVOUS, ANXIOUS, OR ON EDGE: SEVERAL DAYS
7. FEELING AFRAID AS IF SOMETHING AWFUL MIGHT HAPPEN: NOT AT ALL
IF YOU CHECKED OFF ANY PROBLEMS ON THIS QUESTIONNAIRE, HOW DIFFICULT HAVE THESE PROBLEMS MADE IT FOR YOU TO DO YOUR WORK, TAKE CARE OF THINGS AT HOME, OR GET ALONG WITH OTHER PEOPLE: NOT DIFFICULT AT ALL
6. BECOMING EASILY ANNOYED OR IRRITABLE: NOT AT ALL
3. WORRYING TOO MUCH ABOUT DIFFERENT THINGS: SEVERAL DAYS
GAD7 TOTAL SCORE: 5
2. NOT BEING ABLE TO STOP OR CONTROL WORRYING: SEVERAL DAYS

## 2021-10-21 ASSESSMENT — ENCOUNTER SYMPTOMS
NERVOUS/ANXIOUS: 0
SHORTNESS OF BREATH: 0
FEVER: 0
LIGHT-HEADEDNESS: 0

## 2021-10-21 ASSESSMENT — PATIENT HEALTH QUESTIONNAIRE - PHQ9
5. POOR APPETITE OR OVEREATING: SEVERAL DAYS
SUM OF ALL RESPONSES TO PHQ QUESTIONS 1-9: 1

## 2021-10-21 ASSESSMENT — MIFFLIN-ST. JEOR: SCORE: 2009

## 2021-10-21 ASSESSMENT — PAIN SCALES - GENERAL: PAINLEVEL: MODERATE PAIN (4)

## 2021-10-21 NOTE — PROGRESS NOTES
Assessment & Plan     Acute bilateral low back pain without sciatica  Patient is a 26-year-old male who presents to clinic due to 1-2 months of low back pain that radiates towards hips.  No acute injury or trauma.  Vital signs normal.  Physical exam without acute abnormalities.  Strength appears intact.  No numbness or tingling to suggest nerve compression.  No tenderness palpation of spine to suggest bony pathology.  Symptoms are most likely muscular in nature.  Recommended heat/ice, ibuprofen, physical therapy, and regular exercise/strengthening.  Referral placed to PT.  Return precautions provided.  - [facility]; Future    See Patient Instructions    Return in about 4 weeks (around 11/18/2021), or if symptoms worsen or fail to improve.    MICHAEL [facility] ABRAHAM Martin is a 26 year old who presents for the following health issues:    HPI     Back Pain   Onset/Duration: 1-2 months of bilateral low back/hip pain. No trauma or injury. Patient works in [employer] at [employer].   Description:   Location of pain: low back bilateral starting at paraspinal musculature and extending laterally.   Character of pain: sharp and dull ache  Pain radiation: none  New numbness or weakness in legs, not attributed to pain: no   Intensity: At its worst 8-9/10  Progression of Symptoms: intermittent  History:   Specific cause: none  Pain interferes with job: YES  History of back problems: no prior back problems  Any previous MRI or X-rays: None  Sees a specialist for back pain: No  Alleviating factors:   Improved by: Naproxen     Precipitating factors:  Worsened by: Sitting, Lifting, and Lying Flat  Therapies tried and outcome: Ibuprofen and stretching little relief     Accompanying Signs & Symptoms:  Risk of Fracture: None  Risk of Cauda Equina: None  Risk of Infection: None  Risk of Cancer: None  Risk of Ankylosing Spondylitis: Onset at age <35, male, AND morning back stiffness  "no      Review of Systems   Constitutional: Negative for fever.   HENT: Negative for congestion.    Respiratory: Negative for shortness of breath.    Cardiovascular: Negative for chest pain.   Skin: Negative for rash.   Neurological: Negative for light-headedness.   Psychiatric/Behavioral: The patient is not nervous/anxious.             Objective    /66   Pulse 59   Temp 98  F (36.7  C) (Tympanic)   Resp 14   Ht 1.88 m (6' 2\")   Wt 95.4 kg (210 lb 6 oz)   SpO2 99%   BMI 27.01 kg/m    Body mass index is 27.01 kg/m .  Physical Exam  Vitals and nursing note reviewed.   Constitutional:       General: He is not in acute distress.     Appearance: Normal appearance.   HENT:      Head: Normocephalic and atraumatic.   Eyes:      Extraocular Movements: Extraocular movements intact.      Pupils: Pupils are equal, round, and reactive to light.   Cardiovascular:      Rate and Rhythm: Normal rate and regular rhythm.      Heart sounds: Normal heart sounds.   Pulmonary:      Effort: Pulmonary effort is normal.      Breath sounds: Normal breath sounds.   Musculoskeletal:         General: Normal range of motion.      Cervical back: Normal range of motion.      Comments: Normal gait.  Sensation intact to bilateral lower extremities.  No tenderness palpation of lumbar spine or lumbar musculature.  Negative straight leg raise bilaterally.  Normal range motion of bilateral hip joints.   Skin:     General: Skin is warm and dry.   Neurological:      General: No focal deficit present.      Mental Status: He is alert.   Psychiatric:         Mood and Affect: Mood normal.         Behavior: Behavior normal.                      "

## 2021-10-21 NOTE — PATIENT INSTRUCTIONS
Your low back/hip pain is likely due to muscle strain and imbalance.  For further management/treatment I would like you to start physical therapy.  We placed a referral.  Please call the number listed on your paperwork.  For pain management I would recommend using ibuprofen, heat, and a regular exercise program.  For exercise program I would like you to incorporate cardio and strength focusing on good posture, proper lifting technique, and core strength.  Your symptoms should improve over the next 4 to 6 weeks.  Please reach out with questions or concerns.  Return to clinic for new or worsening symptoms.    Patient Education     Exercises to Strengthen Your Lower Back  Strong lower back and abdominal muscles work together to support your spine. The exercises below will help strengthen the lower back. It's important that you start exercising slowly and increase levels gradually.   Always start any exercise program with stretching. If you feel pain while doing any of these exercises, stop and talk to your doctor about a more specific exercise program that better suits your condition.    Low back stretch  The point of stretching is to make you more flexible and increase your range of motion. Stretch only as much as you are able. Stretch slowly. Don't push your stretch to the limit. If at any point you feel pain while stretching, this is your (temporary) limit.     Lie on your back with your knees bent and both feet on the ground.    Slowly raise your left knee to your chest as you flatten your lower back against the floor. Hold for 5 seconds.    Relax and repeat the exercise with your right knee.    Do 10 of these exercises for each leg.    Repeat hugging both knees to your chest at the same time.  Building lower back strength  Start your exercise routine with 10 to 30 minutes a day, 1 to 3 times a day.  Initial exercises  Lying on your back:  1. Ankle pumps. Move your foot up and down, towards your head, and then  away. Repeat 10 times with each foot.  2. Heel slides. Slowly bend your knee, drawing the heel of your foot towards you. Then slide your heel/foot from you, straightening your knee. Don't lift your foot off the floor (this is not a leg lift).  3. Abdominal contraction. Bend your knees and put your hands on your stomach. Tighten your stomach muscles. Hold for 5 seconds, then relax. Repeat 10 times.  4. Straight leg raise. Bend one leg at the knee and keep the other leg straight. Tighten your stomach muscles. Slowly lift your straight leg 6 to 12 inches off the floor and hold for up to 5 seconds. Repeat 10 times on each side.  Standin. Wall squats. Stand with your back against the wall. Move your feet about 12 inches away from the wall. Tighten your stomach muscles, and slowly bend your knees until they are at about a 45 degree angle. Don't go down too far. Hold about 5 seconds. Then slowly return to your starting position. Repeat 10 times.  2. Heel raises. Stand facing the wall. Slowly raise the heels of your feet up and down, while keeping your toes on the floor. If you have trouble balancing, you can touch the wall with your hands. Repeat 10 times.  More advanced exercises  When you feel comfortable enough, try these exercises.  1. Kneeling lumbar extension. Start on your hands and knees. At the same time, raise and straighten your right arm and left leg until they are parallel to the ground. Hold for 2 seconds and come back slowly to a starting position. Repeat with left arm and right leg, alternating 10 times.  2. Prone lumbar extension. Lie face down, arms extended overhead, palms on the floor. At the same time, raise your right arm and left leg as high as comfortably possible. Hold for 10 seconds and slowly return to start. Repeat with left arm and right leg, alternating 10 times. Gradually build up to 20 times. (Advanced: Repeat this exercise raising both arms and both legs a few inches off the floor at  the same time. Hold for 5 seconds and release.)  3. Pelvic tilt. Lie on the floor on your back with your knees bent at 90 degrees. Your feet should be flat on the floor. Inhale, exhale, then slowly contract your abdominal muscles bringing your navel toward your spine. Let your pelvis rock back until your lower back is flat on the floor. Hold for 10 seconds while breathing smoothly.  4. Abdominal crunch. Perform a pelvic tilt (above) flattening your lower back against the floor. Holding the tension in your abdominal muscles, take another breath and raise your shoulder blades off the ground (this is not a full sit-up). Keep your head in line with your body (don t bend your neck forward). Hold for 2 seconds, then slowly lower.  Spotted last reviewed this educational content on 8/1/2019 2000-2021 [employer]. All rights reserved. This information is not intended as a substitute for professional medical care. Always follow your healthcare professional's instructions.           Patient Education     Back Care Tips     Caring for your back  These are things you can do to prevent a recurrence of acute back pain and to reduce symptoms from chronic back pain:    Stay at a healthy weight. If you are overweight, losing weight will help most types of back pain.    Exercise is an important part of recovery from most types of back pain. The muscles behind and in front of the spine support the back. This means strengthening both the back muscles and the abdominal muscles will provide better support for your spine.     Swimming and brisk walking are good overall exercises to improve your fitness level.    Practice safe lifting methods (see below).    Practice good posture when sitting, standing, and walking. Don't sit for a long time. This puts more stress on the lower back than standing or walking.    Wear quality shoes with good arch support. Foot and ankle alignment can affect back symptoms. Don't wear high  heels.    Therapeutic massage can help relax the back muscles without stretching them.    During the first 24 to 72 hours after an acute injury or flare-up of chronic back pain, put an ice pack on the painful area for 20 minutes and then remove it for 20 minutes. Do thisover a period of 60 to 90 minutes, or several times a day. As a safety precaution, don't use a heating pad at bedtime. Sleeping on a heating pad can lead to skin burns or tissue damage.    You can alternate using ice and heat.  Medicines  Talk with your healthcare provider before using medicines, especially if you have other health problems or are taking other medicines.    You may use over-the-counter medicines, such as acetaminophen, ibuprofen, or naprosyn to control pain, unless your healthcare provider prescribed other pain medicine. Talk with your healthcare provider before taking any medicines if you have a chronic condition such as diabetes, liver or kidney disease, stomach ulcers, or digestive bleeding, or are taking blood thinners.    Be careful if you are given prescription pain medicines, opioids, or medicine for muscle spasm. They can cause drowsiness, and affect your coordination, reflexes, and judgment. Don't drive or operate heavy machinery while taking these types of medicines. Take prescription pain medicine only as prescribed by your healthcare provider.  Lumbar stretch  This simple stretch will help relax muscle spasm and keep your back more limber. If exercise makes your back pain worse, don t do it.    Lie on your back with your knees bent and both feet on the ground.    Slowly raise your left knee to your chest as you flatten your lower back against the floor. Hold for 5 seconds.    Relax and repeat the exercise with your right knee.    Do 10 of these exercises for each leg.  Safe lifting method    Don t bend over at the waist to lift an object off the floor.  Instead, bend your knees and hips in a squat.     Keep your back and  head upright    Hold the object close to your body, directly in front of you.    Straighten your legs to lift the object.     Lower the object to the floor in the reverse fashion.    If you must slide something across the floor, push it.    Posture tips  Sitting  Sit in chairs with straight backs or low-back support. Keep your knees lower than your hips, with your feet flat on the floor.  When driving, sit up straight. Adjust the seat forward so you are not leaning toward the steering wheel.  A small pillow or rolled towel behind your lower back may help if you are driving long distances.   Standing  When standing for long periods, shift most of your weight to one leg at a time. Switch legs every few minutes.   Sleeping  The best way to sleep is on your side with your knees bent. Put a low pillow under your head to support your neck in a neutral spine position. Don't use thick pillows that bend your neck to one side. Put a pillow between your legs to further relax your lower back. If you sleep on your back, put pillows under your knees to support your legs in a slightly flexed position. Use a firm mattress. If your mattress sags, replace it, or use a 1/2-inch plywood board under the mattress to add support.  Follow-up care  Follow up with your healthcare provider, or as advised.  If X-rays, a CT scan or an MRI scan were taken, they may be reviewed by a radiologist. You will be told of any new findings that may affect your care.  Call 911  Call 911 if any of the following occur:    Trouble breathing    Confusion    Very drowsy    Fainting or loss of consciousness    Rapid or very slow heart rate    Loss of  bowel or bladder control  When to seek medical advice  Call your healthcare provider right away if any of the following occur:    Pain becomes worse or spreads to your arms or legs    Weakness or numbness in one or both arms or legs    Numbness in the groin area  Wolf last reviewed this educational content on  11/1/2019 2000-2021 [employer]. All rights reserved. This information is not intended as a substitute for professional medical care. Always follow your healthcare professional's instructions.           Patient Education     Back Spasm (No Trauma)    Spasm of the back muscles can occur after a sudden forceful twisting or bending such as in a car accident. A spasm can also happen after a simple awkward movement, or after lifting something heavy with poor body positioning. In any case, muscle spasm adds to the pain. Sleeping in an awkward position or on a poor quality mattress can also cause this. Some people respond to emotional stress by tensing the muscles of their back.  Pain that continues may need further assessment or other types of treatment such as physical therapy.  You don't always need X-rays for the first assessment of back pain, unless you had a physical injury such as from a car accident or fall. If your pain continues and doesn't respond to medical treatment, X-rays and other tests may then be done.   Home care    As soon as possible, start sitting or walking again. This will help prevent problems from a long bed rest. These problems include muscle weakness, worsening back stiffness and pain, and blood clots in the legs.    When in bed, try to find a position of comfort. A firm mattress is best. Try lying flat on your back with pillows under your knees. You can also try lying on your side with your knees bent up toward your chest and a pillow between your knees.    Don't sit for long periods. Also limit car rides and travel. This puts more stress on the lower back than standing or walking.     During the first 24 to 72 hours after an injury or flare-up, put an ice pack on the painful area for 20 minutes, then remove it for 20 minutes. Do this over a period of 60 to 90 minutes, or several times a day. This will reduce swelling and pain. Always wrap ice packs in a thin towel.    You can  start with ice, then switch to heat. Heat from a hot shower, hot bath, or heating pad reduces pain and works well for muscle spasms. Put heat on the painful area for 20 minutes, then remove it for 20 minutes. Do this over a period of 60 to 90 minutes, or several times a day. Don't sleep on a heating pad. It can burn or damage skin.    Alternate using ice and heat.    Be aware of safe lifting methods. don't lift anything over 15 pounds until all the pain is gone.  Gentle stretching will help your back heal faster. Do this simple routine 2 to 3 times a day until your back is feeling better.    Lie on your back with your knees bent and both feet on the ground.    Slowly raise your left knee to your chest as you flatten your lower back against the floor. Hold for 20 to 30 seconds.    Relax and repeat the exercise with your right knee.    Do 2 to 3 of these exercises for each leg.    Repeat, hugging both knees to your chest at the same time.    Don't bounce, but use a gentle pull.  Medicines  Talk with your doctor before using medicine, especially if you have other medical problems or are taking other medicines.  You may use over-the-counter medicines such as acetaminophen, ibuprofen, or naprosyn to control pain, unless your healthcare provider prescribed another pain medicine. Talk with your healthcare provider if you have a chronic condition such as diabetes, liver or kidney disease, stomach ulcer, or digestive bleeding, or are taking blood thinners.  Be careful if you are given prescription pain medicine, opioids, or medicine for muscle spasm. They can cause drowsiness, and affect your coordination, reflexes, and judgment. Don't drive or operate heavy machinery when taking these medicines. Take pain medicine only as prescribed by your healthcare provider.  Follow-up care  Follow up with your doctor, or as advised. You may need physical therapy or more tests.  If X-rays were taken, they may be reviewed by a  radiologist. You will be told of any new findings that may affect your care.  Call   Call if any of these occur:    Trouble breathing    Confusion    Drowsiness or trouble awakening    Fainting or loss of consciousness    Rapid or very slow heart rate    Loss of bowel or bladder control  When to seek medical advice  Call your healthcare provider right away if any of these occur:    Pain becomes worse or spreads to your legs    Weakness or numbness in one or both legs    Numbness in the groin or genital area    Fever of 100.4 F (38 C) or higher , or as directed by your healthcare provider    Chills    Burning or pain when passing urine  [employer] last reviewed this educational content on 11/1/2018 2000-2021 [employer]. All rights reserved. This information is not intended as a substitute for professional medical care. Always follow your healthcare professional's instructions.

## 2021-10-22 ASSESSMENT — ANXIETY QUESTIONNAIRES: GAD7 TOTAL SCORE: 5

## 2021-12-28 ENCOUNTER — MYC MEDICAL ADVICE (OUTPATIENT)
Dept: FAMILY MEDICINE | Facility: CLINIC | Age: 26
End: 2021-12-28
Payer: COMMERCIAL

## 2021-12-28 DIAGNOSIS — G47.00 INSOMNIA, UNSPECIFIED TYPE: ICD-10-CM

## 2021-12-28 RX ORDER — ZOLPIDEM TARTRATE 5 MG/1
TABLET ORAL
Qty: 30 TABLET | Refills: 0 | OUTPATIENT
Start: 2021-12-28

## 2021-12-28 NOTE — TELEPHONE ENCOUNTER
Routing refill request to provider for review/approval because:  Drug not on the [facility] refill protocol     Last Written Prescription Date:  12-1-21  Last Fill Quantity: 30,  # refills: 0   Last office visit: 5-7-21 with prescribing provider:  Ana Langston   Future Office Visit:        Did see same day provider on 10-21-21 for acute issue

## 2022-01-08 ENCOUNTER — HEALTH MAINTENANCE LETTER (OUTPATIENT)
Age: 27
End: 2022-01-08

## 2022-11-19 ENCOUNTER — HEALTH MAINTENANCE LETTER (OUTPATIENT)
Age: 27
End: 2022-11-19

## 2023-04-09 ENCOUNTER — HEALTH MAINTENANCE LETTER (OUTPATIENT)
Age: 28
End: 2023-04-09

## 2023-11-30 ENCOUNTER — OFFICE VISIT (OUTPATIENT)
Dept: FAMILY MEDICINE | Facility: CLINIC | Age: 28
End: 2023-11-30
Payer: COMMERCIAL

## 2023-11-30 VITALS
SYSTOLIC BLOOD PRESSURE: 121 MMHG | HEART RATE: 87 BPM | TEMPERATURE: 98 F | OXYGEN SATURATION: 99 % | HEIGHT: 74 IN | BODY MASS INDEX: 28.62 KG/M2 | RESPIRATION RATE: 20 BRPM | WEIGHT: 223 LBS | DIASTOLIC BLOOD PRESSURE: 74 MMHG

## 2023-11-30 DIAGNOSIS — M72.2 PLANTAR FASCIITIS OF LEFT FOOT: Primary | ICD-10-CM

## 2023-11-30 PROCEDURE — 99213 OFFICE O/P EST LOW 20 MIN: CPT | Performed by: PHYSICIAN ASSISTANT

## 2023-11-30 ASSESSMENT — ANXIETY QUESTIONNAIRES
2. NOT BEING ABLE TO STOP OR CONTROL WORRYING: SEVERAL DAYS
GAD7 TOTAL SCORE: 4
GAD7 TOTAL SCORE: 4
8. IF YOU CHECKED OFF ANY PROBLEMS, HOW DIFFICULT HAVE THESE MADE IT FOR YOU TO DO YOUR WORK, TAKE CARE OF THINGS AT HOME, OR GET ALONG WITH OTHER PEOPLE?: SOMEWHAT DIFFICULT
1. FEELING NERVOUS, ANXIOUS, OR ON EDGE: NOT AT ALL
3. WORRYING TOO MUCH ABOUT DIFFERENT THINGS: SEVERAL DAYS
GAD7 TOTAL SCORE: 4
5. BEING SO RESTLESS THAT IT IS HARD TO SIT STILL: NOT AT ALL
7. FEELING AFRAID AS IF SOMETHING AWFUL MIGHT HAPPEN: SEVERAL DAYS
IF YOU CHECKED OFF ANY PROBLEMS ON THIS QUESTIONNAIRE, HOW DIFFICULT HAVE THESE PROBLEMS MADE IT FOR YOU TO DO YOUR WORK, TAKE CARE OF THINGS AT HOME, OR GET ALONG WITH OTHER PEOPLE: SOMEWHAT DIFFICULT
6. BECOMING EASILY ANNOYED OR IRRITABLE: NOT AT ALL
7. FEELING AFRAID AS IF SOMETHING AWFUL MIGHT HAPPEN: SEVERAL DAYS
4. TROUBLE RELAXING: SEVERAL DAYS

## 2023-11-30 ASSESSMENT — PATIENT HEALTH QUESTIONNAIRE - PHQ9
10. IF YOU CHECKED OFF ANY PROBLEMS, HOW DIFFICULT HAVE THESE PROBLEMS MADE IT FOR YOU TO DO YOUR WORK, TAKE CARE OF THINGS AT HOME, OR GET ALONG WITH OTHER PEOPLE: SOMEWHAT DIFFICULT
SUM OF ALL RESPONSES TO PHQ QUESTIONS 1-9: 3
SUM OF ALL RESPONSES TO PHQ QUESTIONS 1-9: 3

## 2023-11-30 ASSESSMENT — PAIN SCALES - GENERAL: PAINLEVEL: SEVERE PAIN (6)

## 2023-11-30 NOTE — LETTER
November 30, 2023      Mario Raines  [id number] [id number]        To Whom It May Concern:    Mario Raines was seen in our clinic. He may return to work with the following: Limited walking/standing.  Mainly seated duty. Normal hours ok. In affect from 11/30/2023 through 12/08/2023.      Sincerely,        Ama Chacko PA-C

## 2023-11-30 NOTE — PATIENT INSTRUCTIONS
At [facility], we strive to deliver an exceptional experience to you, every time we see you. If you receive a survey, please complete it as we do value your feedback.  If you have MyChart, you can expect to receive results automatically within 24 hours of their completion.  Your provider will send a note interpreting your results as well.   If you do not have MyChart, you should receive your results in about a week by mail.    Your care team:                            Family Medicine Internal Medicine   MD Khang Noriega MD Shantel Branch-Fleming, MD Srinivasa Vaka, MD Katya Belousova, PACLAUDIA Coleman, MD Pediatrics   Gokul Martinez, PAMD Nilda Jang MD Amelia Massimini APRN CNP   ALICE Devine CNP, MD Charanya Pasupathi, MD Kathleen Widmer, NP coming October 2023 Same-Day (No follow up visit)    MICHAEL West PA coming Oct 2023     Clinic hours: Monday - Thursday 7 am-6 pm; Fridays 7 am-5 pm.   Urgent care: Monday - Friday 10 am- 8 pm; Saturday and Sunday 9 am-5 pm.    Clinic: [phone]       [facility]: Monday - Thursday 8 am - 7 pm; Friday 8 am - 6 pm  [facility]: [phone]

## 2024-05-31 SDOH — HEALTH STABILITY: PHYSICAL HEALTH: ON AVERAGE, HOW MANY MINUTES DO YOU ENGAGE IN EXERCISE AT THIS LEVEL?: 30 MIN

## 2024-05-31 SDOH — HEALTH STABILITY: PHYSICAL HEALTH: ON AVERAGE, HOW MANY DAYS PER WEEK DO YOU ENGAGE IN MODERATE TO STRENUOUS EXERCISE (LIKE A BRISK WALK)?: 5 DAYS

## 2024-05-31 ASSESSMENT — SOCIAL DETERMINANTS OF HEALTH (SDOH): HOW OFTEN DO YOU GET TOGETHER WITH FRIENDS OR RELATIVES?: TWICE A WEEK

## 2024-06-05 ENCOUNTER — OFFICE VISIT (OUTPATIENT)
Dept: FAMILY MEDICINE | Facility: CLINIC | Age: 29
End: 2024-06-05
Payer: COMMERCIAL

## 2024-06-05 ENCOUNTER — ANCILLARY PROCEDURE (OUTPATIENT)
Dept: GENERAL RADIOLOGY | Facility: CLINIC | Age: 29
End: 2024-06-05
Payer: COMMERCIAL

## 2024-06-05 VITALS
TEMPERATURE: 98.7 F | BODY MASS INDEX: 25.05 KG/M2 | HEIGHT: 73 IN | SYSTOLIC BLOOD PRESSURE: 129 MMHG | RESPIRATION RATE: 14 BRPM | OXYGEN SATURATION: 100 % | WEIGHT: 189 LBS | HEART RATE: 88 BPM | DIASTOLIC BLOOD PRESSURE: 81 MMHG

## 2024-06-05 DIAGNOSIS — R07.89 STERNUM PAIN: ICD-10-CM

## 2024-06-05 DIAGNOSIS — R07.89 STERNUM PAIN: Primary | ICD-10-CM

## 2024-06-05 DIAGNOSIS — R70.0 ELEVATED ERYTHROCYTE SEDIMENTATION RATE: ICD-10-CM

## 2024-06-05 DIAGNOSIS — D64.9 LOW HEMOGLOBIN: ICD-10-CM

## 2024-06-05 DIAGNOSIS — G47.00 INSOMNIA, UNSPECIFIED TYPE: ICD-10-CM

## 2024-06-05 DIAGNOSIS — G89.29 CHRONIC RIGHT SHOULDER PAIN: ICD-10-CM

## 2024-06-05 DIAGNOSIS — M54.50 CHRONIC BILATERAL LOW BACK PAIN WITHOUT SCIATICA: ICD-10-CM

## 2024-06-05 DIAGNOSIS — R79.82 ELEVATED C-REACTIVE PROTEIN (CRP): ICD-10-CM

## 2024-06-05 DIAGNOSIS — F33.0 MILD EPISODE OF RECURRENT MAJOR DEPRESSIVE DISORDER (H): ICD-10-CM

## 2024-06-05 DIAGNOSIS — M25.50 POLYARTHRALGIA: ICD-10-CM

## 2024-06-05 DIAGNOSIS — G89.29 CHRONIC BILATERAL LOW BACK PAIN WITHOUT SCIATICA: ICD-10-CM

## 2024-06-05 DIAGNOSIS — G47.33 OSA (OBSTRUCTIVE SLEEP APNEA): ICD-10-CM

## 2024-06-05 DIAGNOSIS — F41.1 GENERALIZED ANXIETY DISORDER: ICD-10-CM

## 2024-06-05 DIAGNOSIS — M25.511 CHRONIC RIGHT SHOULDER PAIN: ICD-10-CM

## 2024-06-05 PROBLEM — E55.9 VITAMIN D DEFICIENCY: Status: RESOLVED | Noted: 2018-04-03 | Resolved: 2024-06-05

## 2024-06-05 PROBLEM — E66.01 MORBID OBESITY (H): Status: RESOLVED | Noted: 2019-03-04 | Resolved: 2024-06-05

## 2024-06-05 PROBLEM — F41.0 PANIC ATTACK: Status: RESOLVED | Noted: 2017-05-15 | Resolved: 2024-06-05

## 2024-06-05 PROBLEM — S62.336A CLOSED DISPLACED FRACTURE OF NECK OF FIFTH METACARPAL BONE OF RIGHT HAND: Status: RESOLVED | Noted: 2017-06-02 | Resolved: 2024-06-05

## 2024-06-05 LAB
BASOPHILS # BLD AUTO: 0 10E3/UL (ref 0–0.2)
BASOPHILS NFR BLD AUTO: 1 %
EOSINOPHIL # BLD AUTO: 0.1 10E3/UL (ref 0–0.7)
EOSINOPHIL NFR BLD AUTO: 1 %
ERYTHROCYTE [DISTWIDTH] IN BLOOD BY AUTOMATED COUNT: 12.7 % (ref 10–15)
ERYTHROCYTE [SEDIMENTATION RATE] IN BLOOD BY WESTERGREN METHOD: 56 MM/HR (ref 0–15)
HCT VFR BLD AUTO: 39.2 % (ref 40–53)
HGB BLD-MCNC: 12.7 G/DL (ref 13.3–17.7)
IMM GRANULOCYTES # BLD: 0 10E3/UL
IMM GRANULOCYTES NFR BLD: 0 %
LYMPHOCYTES # BLD AUTO: 1.2 10E3/UL (ref 0.8–5.3)
LYMPHOCYTES NFR BLD AUTO: 15 %
MCH RBC QN AUTO: 28.1 PG (ref 26.5–33)
MCHC RBC AUTO-ENTMCNC: 32.4 G/DL (ref 31.5–36.5)
MCV RBC AUTO: 87 FL (ref 78–100)
MONOCYTES # BLD AUTO: 0.9 10E3/UL (ref 0–1.3)
MONOCYTES NFR BLD AUTO: 11 %
NEUTROPHILS # BLD AUTO: 5.8 10E3/UL (ref 1.6–8.3)
NEUTROPHILS NFR BLD AUTO: 73 %
PLATELET # BLD AUTO: 418 10E3/UL (ref 150–450)
RBC # BLD AUTO: 4.52 10E6/UL (ref 4.4–5.9)
WBC # BLD AUTO: 8 10E3/UL (ref 4–11)

## 2024-06-05 PROCEDURE — 85652 RBC SED RATE AUTOMATED: CPT

## 2024-06-05 PROCEDURE — 82306 VITAMIN D 25 HYDROXY: CPT

## 2024-06-05 PROCEDURE — 71046 X-RAY EXAM CHEST 2 VIEWS: CPT | Mod: TC | Performed by: RADIOLOGY

## 2024-06-05 PROCEDURE — 99214 OFFICE O/P EST MOD 30 MIN: CPT

## 2024-06-05 PROCEDURE — 84443 ASSAY THYROID STIM HORMONE: CPT

## 2024-06-05 PROCEDURE — 96127 BRIEF EMOTIONAL/BEHAV ASSMT: CPT

## 2024-06-05 PROCEDURE — 85025 COMPLETE CBC W/AUTO DIFF WBC: CPT

## 2024-06-05 PROCEDURE — 86140 C-REACTIVE PROTEIN: CPT

## 2024-06-05 PROCEDURE — 82728 ASSAY OF FERRITIN: CPT

## 2024-06-05 PROCEDURE — 80053 COMPREHEN METABOLIC PANEL: CPT

## 2024-06-05 PROCEDURE — 36415 COLL VENOUS BLD VENIPUNCTURE: CPT

## 2024-06-05 RX ORDER — NAPROXEN 500 MG/1
250-500 TABLET ORAL 2 TIMES DAILY WITH MEALS
Qty: 60 TABLET | Refills: 0 | Status: SHIPPED | OUTPATIENT
Start: 2024-06-05

## 2024-06-05 ASSESSMENT — PATIENT HEALTH QUESTIONNAIRE - PHQ9
SUM OF ALL RESPONSES TO PHQ QUESTIONS 1-9: 8
10. IF YOU CHECKED OFF ANY PROBLEMS, HOW DIFFICULT HAVE THESE PROBLEMS MADE IT FOR YOU TO DO YOUR WORK, TAKE CARE OF THINGS AT HOME, OR GET ALONG WITH OTHER PEOPLE: SOMEWHAT DIFFICULT
SUM OF ALL RESPONSES TO PHQ QUESTIONS 1-9: 8

## 2024-06-05 ASSESSMENT — PAIN SCALES - GENERAL: PAINLEVEL: NO PAIN (0)

## 2024-06-05 NOTE — PROGRESS NOTES
Preventive Care Visit  [facility]  ALICE Morales CNP, Nurse Practitioner Primary Care  Jun 5, 2024    Assessment & Plan     Sternum pain  - significant sternum pain, worse with movements and touch  - discussed ddx, likely msk in nature  - will check chest x-ray and basic labs w/ history of increased msk pain lately   - XR Chest 2 Views  - ESR: Erythrocyte sedimentation rate  - Comprehensive metabolic panel (BMP + Alb, Alk Phos, ALT, AST, Total. Bili, TP)  - CRP, inflammation  - CBC with platelets and differential  - will try treating with NSAID  - naproxen (NAPROSYN) 500 MG tablet  Dispense: 60 tablet; Refill: 0  - The uses and side effects, including black box warnings as appropriate, were discussed in detail.  All patient questions were answered.  The patient was instructed to call immediately if any side effects developed. Advised to take with food  - reviewed red flag symptoms and when to present to ER/UC prn     Chronic right shoulder pain  - chronic pain, working with PT  - Orthopedic  Referral    Chronic bilateral low back pain without sciatica  - chronic pain, improving slightly recently     JIMBO (obstructive sleep apnea)  - history of severe JIMBO, not currently using CPAP machine  - Adult Sleep Eval & Management  Referral    Insomnia, unspecified type  - reports worsening insomnia, discussed ddx  - referral to sleep medicine w/ history of JIMBO    Generalized anxiety disorder  Mild episode of recurrent major depressive disorder (H24)  - not well controlled, no SI/HI/AH/VH  - declines medications or therapy referral  - advised follow up prn if symptoms fail to improve or worsen  - advised ER prn if develops SI/HI/AH/VH    RTC prn.     The patient verbalized understanding and agreement with the plan today and has no additional questions or concerns at this time.      Lois Martin is a 28 year old, presenting for the following:    HPI    Shoulder and  "chest pain - reports significant pain in shoulder and chest. Symptoms started about a month ago, was mild until 5 days ago and now it is \"borderline unbearable\". Reports the pain feels center of the chest on the sternum and feels like it is drifting down arms. Described pain as a 8/10, constant but worse with movement of head or touching the area. Pain is worse with deep breathing. Pain worse with turning neck to the side. Denies dyspnea, numbness, tingling, weakness, syncope or near syncope. Tried ibuprofen 2 tablets but this did not help at all. Denies fevers. Symptoms are not worse with eating. No others at home have similar symptoms. Denies cough, unsure if there is a bump in the area. No jaw pain or dental concerns. No severe headaches or vision changes. Did not eat anything that caused it, no trouble swallowing. No nausea or vomiting. Has tried stretching, but this doesn't seem to help. Denies any injury that he can think of. Did start physical therapy a few months ago for his shoulder. The physical therapy has been helping with his shoulder. History of plantar fascitis and hip pain and back pain as well. Does lift heavy things at work. Denies family history of cardiovascular conditions or sudden cardiac death. No family history of stroke. Denies shortness of breath, headaches, vision changes, urinary changes, palpations, or edema. Pain is not worse with exertion.     Depression and anxiety - reports both are still present, but feels they are fairly controlled. Tries to ignore symptoms. Reports previously being on many different medications for this in the past. Was on prozac he thinks. Most recently stopped about 3 years ago. No current SI/HI/AH/VH. Reports history of working with therapy, but not currently. Sleep has been bad lately. Reports the last several months had issues with \"doing poorly\" reports this is because of pain.           Review of Systems  Constitutional, HEENT, cardiovascular, pulmonary, " "GI, , musculoskeletal, neuro, skin, endocrine and psych systems are negative, except as otherwise noted.     Objective    Exam  /81   Pulse 88   Temp 98.7  F (37.1  C) (Oral)   Resp 14   Ht 1.86 m (6' 1.23\")   Wt 85.7 kg (189 lb)   SpO2 100%   BMI 24.78 kg/m     Estimated body mass index is 24.78 kg/m  as calculated from the following:    Height as of this encounter: 1.86 m (6' 1.23\").    Weight as of this encounter: 85.7 kg (189 lb).    Physical Exam    General:  alert, oriented, pleasant and conversant. NAD. Non-toxic  HEENT:  normocephalic, atraumatic. Sclera white, conjunctiva clear, EOMs intact. TMs grey, cone of light and bony landmarks visualized bilaterally. Nares patent. OP w/o erythema, edema or lesions.  Normal dentition  Neck:  supple, FROM; no thyromegaly, lymphadenopathy, or masses   Chest: chest expansion symmetrical bilaterally, lung sounds clear without wheezes, rhonchi or rales. +increased pain of upper sternum with palpation of area. No erythema, edema, or ecchymosis. No obvious abnormalities. No ulcers or skin lesions. Pain worsened with movement of neck, but FROM neck without neck tenderness.   CV: S1, S2, RRR without murmurs, rubs or gallops. No edema  MSK: steady gait, FROM  Derm: no rashes, lesions, or ulcers. No erythema, induration or nodules  Neuro: CNII-XII grossly intact, clear and logical thought and speech  Psych:  cooperative, calm mood and congruent affect    Signed Electronically by: ALICE Morales CNP  "

## 2024-06-05 NOTE — PATIENT INSTRUCTIONS
"At [facility], we strive to deliver an exceptional experience to you, every time we see you. If you receive a survey, please complete it as we do value your feedback.  If you have MyChart, you can expect to receive results automatically within 24 hours of their completion.  Your provider will send a note interpreting your results as well.   If you do not have MyChart, you should receive your results in about a week by mail.    Your care team:                            Family Medicine Internal Medicine   MD Khang Noriega, MD Dayanara Heredia, MD Yoly Horvath, PAKatyC    Suhail Coleman, MD Pediatrics   Gokul Martinez, PA-C  Char Hunt, MD Nilda Palacios, MD Stephie Valencia APRDAVID CNP   ALICE Devine CNP, MD Alfonzo Ponce, MD Ada Gabriel, CNP  Same-Day (No follow up visit)   Zack Blake, HEATHER Goldberg, PAKatyC    Ama Chacko PAKatyC     Clinic hours: Monday - Thursday 7 am-6 pm; Fridays 7 am-5 pm.   Urgent care: Monday - Friday 10 am- 8 pm; Saturday and Sunday 9 am-5 pm.    Clinic: [phone]       [facility]: Monday - Thursday 8 am - 7 pm; Friday 8 am - 6 pm  [facility]: [phone]     Preventive Care Advice   This is general advice we often give to help people stay healthy. Your care team may have specific advice just for you. Please talk to your care team about your own preventive care needs.  Lifestyle  Exercise at least 150 minutes each week (30 minutes a day, 5 days a week).  Do muscle strengthening activities 2 days a week. These help control your weight and prevent disease.  No smoking.  Wear sunscreen to prevent skin cancer.  Have your home tested for radon every 2 to 5 years. Radon is a colorless, odorless gas that can harm your lungs. To learn more, go to www.health.state.[place] and search for \"Radon in Homes.\"  Keep guns unloaded " "and locked up in a safe place like a safe or gun vault, or, use a gun lock and hide the keys. Always lock away bullets separately. To learn more, visit dps.[place].gov and search for \"safe gun storage.\"  Nutrition  Eat 5 or more servings of fruits and vegetables each day.  Try wheat bread, brown rice and whole grain pasta (instead of white bread, rice, and pasta).  Get enough calcium and vitamin D. Check the label on foods and aim for 100% of the RDA (recommended daily allowance).  Regular exams  Have a dental exam and cleaning every 6 months.  See your health care team every year to talk about:  Any changes in your health.  Any medicines your care team has prescribed.  Preventive care, family planning, and ways to prevent chronic diseases.  Shots (vaccines)   HPV shots (up to age 26), if you've never had them before.  Hepatitis B shots (up to age 59), if you've never had them before.  COVID-19 shot: Get this shot when it's due.  Flu shot: Get a flu shot every year.  Tetanus shot: Get a tetanus shot every 10 years.  Pneumococcal, hepatitis A, and RSV shots: Ask your care team if you need these based on your risk.  Shingles shot (for age 50 and up).  General health tests  Diabetes screening:  Starting at age 35, Get screened for diabetes at least every 3 years.  If you are younger than age 35, ask your care team if you should be screened for diabetes.  Cholesterol test: At age 39, start having a cholesterol test every 5 years, or more often if advised.  Bone density scan (DEXA): At age 50, ask your care team if you should have this scan for osteoporosis (brittle bones).  Hepatitis C: Get tested at least once in your life.  Abdominal aortic aneurysm screening: Talk to your doctor about having this screening if you:  Have ever smoked; and  Are biologically male; and  Are between the ages of 65 and 75.  STIs (sexually transmitted infections)  Before age 24: Ask your care team if you should be screened for STIs.  After age " 24: Get screened for STIs if you're at risk. You are at risk for STIs (including HIV) if:  You are sexually active with more than one person.  You don't use condoms every time.  You or a partner was diagnosed with a sexually transmitted infection.  If you are at risk for HIV, ask about PrEP medicine to prevent HIV.  Get tested for HIV at least once in your life, whether you are at risk for HIV or not.  Cancer screening tests  Cervical cancer screening: If you have a cervix, begin getting regular cervical cancer screening tests at age 21. Most people who have regular screenings with normal results can stop after age 65. Talk about this with your provider.  Breast cancer scan (mammogram): If you've ever had breasts, begin having regular mammograms starting at age 40. This is a scan to check for breast cancer.  Colon cancer screening: It is important to start screening for colon cancer at age 45.  Have a colonoscopy test every 10 years (or more often if you're at risk) Or, ask your provider about stool tests like a FIT test every year or Cologuard test every 3 years.  To learn more about your testing options, visit: www.appsplit/396056.pdf.  For help making a decision, visit: marge/ic41519.  Prostate cancer screening test: If you have a prostate and are age 55 to 69, ask your provider if you would benefit from a yearly prostate cancer screening test.  Lung cancer screening: If you are a current or former smoker age 50 to 80, ask your care team if ongoing lung cancer screenings are right for you.  For informational purposes only. Not to replace the advice of your health care provider. Copyright   2023 [facility]. All rights reserved. Clinically reviewed by the [facility]. [employer] 827893 - REV 04/24.    Learning About Depression Screening  What is depression screening?  Depression screening is a way to see if you have depression symptoms. It may be done by a doctor or  "counselor. It's often part of a routine checkup. That's because your mental health is just as important as your physical health.  Depression is a mental health condition that affects how you feel, think, and act. You may:  Have less energy.  Lose interest in your daily activities.  Feel sad and grouchy for a long time.  Depression is very common. It affects people of all ages.  Many things can lead to depression. Some people become depressed after they have a stroke or find out they have a major illness like cancer or heart disease. The death of a loved one or a breakup may lead to depression. It can run in families. Most experts believe that a combination of inherited genes and stressful life events can cause it.  What happens during screening?  You may be asked to fill out a form about your depression symptoms. You and the doctor will discuss your answers. The doctor may ask you more questions to learn more about how you think, act, and feel.  What happens after screening?  If you have symptoms of depression, your doctor will talk to you about your options.  Doctors usually treat depression with medicines or counseling. Often, combining the two works best. Many people don't get help because they think that they'll get over the depression on their own. But people with depression may not get better unless they get treatment.  The cause of depression is not well understood. There may be many factors involved. But if you have depression, it's not your fault.  A serious symptom of depression is thinking about death or suicide. If you or someone you care about talks about this or about feeling hopeless, get help right away.  It's important to know that depression can be treated. Medicine, counseling, and self-care may help.  Where can you learn more?  Go to https://www.healthwise.net/patiented  Enter T185 in the search box to learn more about \"Learning About Depression Screening.\"  Current as of: June 24, " 2023               Content Version: 14.0    [id number] [employer].   Care instructions adapted under license by your healthcare professional. If you have questions about a medical condition or this instruction, always ask your healthcare professional. [employer] disclaims any warranty or liability for your use of this information.

## 2024-06-06 LAB
ALBUMIN SERPL BCG-MCNC: 4.3 G/DL (ref 3.5–5.2)
ALP SERPL-CCNC: 79 U/L (ref 40–150)
ALT SERPL W P-5'-P-CCNC: 16 U/L (ref 0–70)
ANION GAP SERPL CALCULATED.3IONS-SCNC: 12 MMOL/L (ref 7–15)
AST SERPL W P-5'-P-CCNC: 22 U/L (ref 0–45)
BILIRUB SERPL-MCNC: 0.2 MG/DL
BUN SERPL-MCNC: 8.9 MG/DL (ref 6–20)
CALCIUM SERPL-MCNC: 9.7 MG/DL (ref 8.6–10)
CHLORIDE SERPL-SCNC: 100 MMOL/L (ref 98–107)
CREAT SERPL-MCNC: 0.68 MG/DL (ref 0.67–1.17)
CRP SERPL-MCNC: 67.81 MG/L
DEPRECATED HCO3 PLAS-SCNC: 26 MMOL/L (ref 22–29)
EGFRCR SERPLBLD CKD-EPI 2021: >90 ML/MIN/1.73M2
GLUCOSE SERPL-MCNC: 86 MG/DL (ref 70–99)
POTASSIUM SERPL-SCNC: 4.6 MMOL/L (ref 3.4–5.3)
PROT SERPL-MCNC: 8.4 G/DL (ref 6.4–8.3)
SODIUM SERPL-SCNC: 138 MMOL/L (ref 135–145)

## 2024-06-07 DIAGNOSIS — D64.9 LOW HEMOGLOBIN: ICD-10-CM

## 2024-06-07 DIAGNOSIS — R70.0 ELEVATED ERYTHROCYTE SEDIMENTATION RATE: ICD-10-CM

## 2024-06-07 DIAGNOSIS — R79.82 ELEVATED C-REACTIVE PROTEIN (CRP): ICD-10-CM

## 2024-06-07 DIAGNOSIS — M25.50 POLYARTHRALGIA: Primary | ICD-10-CM

## 2024-06-07 LAB
FERRITIN SERPL-MCNC: 376 NG/ML (ref 31–409)
TSH SERPL DL<=0.005 MIU/L-ACNC: 1.05 UIU/ML (ref 0.3–4.2)
VIT D+METAB SERPL-MCNC: 69 NG/ML (ref 20–50)

## 2024-06-08 ENCOUNTER — LAB (OUTPATIENT)
Dept: LAB | Facility: CLINIC | Age: 29
End: 2024-06-08
Payer: COMMERCIAL

## 2024-06-08 DIAGNOSIS — M25.50 POLYARTHRALGIA: ICD-10-CM

## 2024-06-08 DIAGNOSIS — R79.82 ELEVATED C-REACTIVE PROTEIN (CRP): ICD-10-CM

## 2024-06-08 DIAGNOSIS — R70.0 ELEVATED ERYTHROCYTE SEDIMENTATION RATE: ICD-10-CM

## 2024-06-08 PROCEDURE — 86431 RHEUMATOID FACTOR QUANT: CPT

## 2024-06-08 PROCEDURE — 86038 ANTINUCLEAR ANTIBODIES: CPT

## 2024-06-08 PROCEDURE — 36415 COLL VENOUS BLD VENIPUNCTURE: CPT

## 2024-06-08 PROCEDURE — 86200 CCP ANTIBODY: CPT

## 2024-06-09 LAB — RHEUMATOID FACT SERPL-ACNC: <10 IU/ML

## 2024-06-10 DIAGNOSIS — M25.50 POLYARTHRALGIA: Primary | ICD-10-CM

## 2024-06-10 LAB
ANA SER QL IF: NEGATIVE
CCP AB SER IA-ACNC: 46 U/ML

## 2024-06-16 ENCOUNTER — HEALTH MAINTENANCE LETTER (OUTPATIENT)
Age: 29
End: 2024-06-16

## 2024-06-21 ENCOUNTER — OFFICE VISIT (OUTPATIENT)
Dept: FAMILY MEDICINE | Facility: CLINIC | Age: 29
End: 2024-06-21
Payer: COMMERCIAL

## 2024-06-21 VITALS
SYSTOLIC BLOOD PRESSURE: 131 MMHG | WEIGHT: 186.6 LBS | DIASTOLIC BLOOD PRESSURE: 79 MMHG | HEIGHT: 73 IN | HEART RATE: 88 BPM | TEMPERATURE: 99 F | BODY MASS INDEX: 24.73 KG/M2 | RESPIRATION RATE: 18 BRPM | OXYGEN SATURATION: 99 %

## 2024-06-21 DIAGNOSIS — Z11.4 SCREENING FOR HIV (HUMAN IMMUNODEFICIENCY VIRUS): ICD-10-CM

## 2024-06-21 DIAGNOSIS — M25.50 MULTIPLE JOINT PAIN: Primary | ICD-10-CM

## 2024-06-21 DIAGNOSIS — Z11.59 NEED FOR HEPATITIS C SCREENING TEST: ICD-10-CM

## 2024-06-21 DIAGNOSIS — Z23 NEED FOR COVID-19 VACCINE: ICD-10-CM

## 2024-06-21 PROCEDURE — G2211 COMPLEX E/M VISIT ADD ON: HCPCS

## 2024-06-21 PROCEDURE — 99214 OFFICE O/P EST MOD 30 MIN: CPT

## 2024-06-21 RX ORDER — DULOXETIN HYDROCHLORIDE 30 MG/1
30 CAPSULE, DELAYED RELEASE ORAL DAILY
Qty: 30 CAPSULE | Refills: 0 | Status: SHIPPED | OUTPATIENT
Start: 2024-06-21 | End: 2024-07-16

## 2024-06-21 ASSESSMENT — PAIN SCALES - GENERAL: PAINLEVEL: MODERATE PAIN (5)

## 2024-06-21 NOTE — PROGRESS NOTES
Assessment & Plan     Multiple joint pain  - abnormal CRP, ESR, cyclic citrullinated peptide antibody and low normocytic, normochromic anemia on prior lab workup with negative SHANTAL and RF  - unremarkable examination today, vitally stable  - will recheck labs:  - CRP, inflammation  - ESR: Erythrocyte sedimentation rate  - Iron and iron binding capacity  - Cyclic Citrullinated Peptide Antibody IgG  - for pain, continue NSAID prn and add:  - DULoxetine (CYMBALTA) 30 MG capsule  Dispense: 30 capsule; Refill: 0  - The uses and side effects, including black box warnings as appropriate, were discussed in detail.  All patient questions were answered.  The patient was instructed to call immediately if any side effects developed.  - reviewed red flag sxs of septic arthritis, etc. And when to present to ER prn   - follow up in 1mo to check in on pain, prn sooner   - keep apt with rheumatology in October as scheduled    Screening for HIV (human immunodeficiency virus)  - declines HIV screening    Need for hepatitis C screening test  - declines Hep C screening    Need for COVID-19 vaccine  - declines covid19 vaccine    The patient verbalized understanding and agreement with the plan today and has no additional questions or concerns at this time.    Lois Martin is a 28 year old, presenting for the following health issues:  Shoulder Pain (And chest follow up)        6/21/2024    12:39 PM   Additional Questions   Roomed by Jaskaran     History of Present Illness       Reason for visit:  Follow up  Symptom onset:  1-2 weeks ago  Symptom intensity:  Moderate  Symptom progression:  Improving    He eats 2-3 servings of fruits and vegetables daily.He consumes 0 sweetened beverage(s) daily.He exercises with enough effort to increase his heart rate 20 to 29 minutes per day.  He exercises with enough effort to increase his heart rate 5 days per week.   He is taking medications regularly.    Shoulder and chest pain - reports  "significant pain in shoulder and chest. Symptoms started about a month ago, was mild until 5 days ago and now it is \"borderline unbearable\". Reports the pain feels center of the chest on the sternum and feels like it is drifting down arms. Described pain as a 8/10, constant but worse with movement of head or touching the area. Pain is worse with deep breathing. Pain worse with turning neck to the side. Denies dyspnea, numbness, tingling, weakness, syncope or near syncope. Tried ibuprofen 2 tablets but this did not help at all. Denies fevers. Symptoms are not worse with eating. No others at home have similar symptoms. Denies cough, unsure if there is a bump in the area. No jaw pain or dental concerns. No severe headaches or vision changes. Did not eat anything that caused it, no trouble swallowing. No nausea or vomiting. Has tried stretching, but this doesn't seem to help. Denies any injury that he can think of. Did start physical therapy a few months ago for his shoulder. The physical therapy has been helping with his shoulder. History of plantar fascitis and hip pain and back pain as well. Does lift heavy things at work. Denies family history of cardiovascular conditions or sudden cardiac death. No family history of stroke. Denies shortness of breath, headaches, vision changes, urinary changes, palpations, or edema. Pain is not worse with exertion.     Interval history:  Reports that he is significantly improved from last visit. Is not currently having any pain. Had one episode about a week after he saw me of sternum pain, happened a day after a core workout. Seems to be worse with exercise and improved with rest. Reports during the day when up and moving it is alright, lifting is still painful. Notices generalized pains when lying in bed, 10pm to 10am usually. Has been sleeping on a couch lately as well as he is dog sitting, last night is the last night (was there for 5days). Reports pain feels more joint related " "than muscle related. Reports the joint stiffness is most often in the hips but it is diffuse and \"moves around\". Denies red, swollen joints anywhere. No fevers or chills. No headaches, vision changes, temporal pain. Does [employer] virtually for PT. No night sweats.     Review of Systems  Constitutional, HEENT, cardiovascular, pulmonary, GI, , musculoskeletal, neuro, skin, endocrine and psych systems are negative, except as otherwise noted.      Objective    /79 (BP Location: Left arm, Patient Position: Sitting, Cuff Size: Adult Regular)   Pulse 88   Temp 99  F (37.2  C) (Temporal)   Resp 18   Ht 1.86 m (6' 1.23\")   Wt 84.6 kg (186 lb 9.6 oz)   SpO2 99%   BMI 24.46 kg/m    Body mass index is 24.46 kg/m .  Physical Exam     General:  alert, oriented, pleasant and conversant. NAD. Non-toxic  HEENT:  normocephalic, atraumatic. Sclera white, conjunctiva clear, EOMs intact. No temporal artery tenderness.   Neck:  supple, FROM  Chest: chest expansion symmetrical bilaterally, lung sounds clear without wheezes, rhonchi or rales  CV: S1, S2, RRR without murmurs, rubs or gallops. No edema  MSK: steady gait, FROM. No erythematous or edematous joints.   Derm: no rashes, lesions, or ulcers. No erythema, induration or nodules  Neuro: CNII-XII grossly intact, clear and logical thought and speech. Strength of UEs 5/5 and of LEs 5/5 bilaterally.   Psych:  cooperative, calm mood and congruent affect     Signed Electronically by: ALICE Morales CNP  "

## 2024-07-05 ENCOUNTER — LAB (OUTPATIENT)
Dept: LAB | Facility: CLINIC | Age: 29
End: 2024-07-05
Payer: COMMERCIAL

## 2024-07-05 DIAGNOSIS — M25.50 MULTIPLE JOINT PAIN: ICD-10-CM

## 2024-07-05 LAB
CRP SERPL-MCNC: 47.2 MG/L
ERYTHROCYTE [SEDIMENTATION RATE] IN BLOOD BY WESTERGREN METHOD: 47 MM/HR (ref 0–15)
IRON BINDING CAPACITY (ROCHE): 256 UG/DL (ref 240–430)
IRON SATN MFR SERPL: 9 % (ref 15–46)
IRON SERPL-MCNC: 22 UG/DL (ref 61–157)

## 2024-07-05 PROCEDURE — 83550 IRON BINDING TEST: CPT

## 2024-07-05 PROCEDURE — 85652 RBC SED RATE AUTOMATED: CPT

## 2024-07-05 PROCEDURE — 86200 CCP ANTIBODY: CPT

## 2024-07-05 PROCEDURE — 36415 COLL VENOUS BLD VENIPUNCTURE: CPT

## 2024-07-05 PROCEDURE — 83540 ASSAY OF IRON: CPT

## 2024-07-05 PROCEDURE — 86140 C-REACTIVE PROTEIN: CPT

## 2024-07-09 LAB — CCP AB SER IA-ACNC: 38 U/ML

## 2024-07-16 DIAGNOSIS — D50.9 IRON DEFICIENCY ANEMIA, UNSPECIFIED IRON DEFICIENCY ANEMIA TYPE: Primary | ICD-10-CM

## 2024-07-16 RX ORDER — FERROUS SULFATE 325(65) MG
325 TABLET ORAL
Qty: 90 TABLET | Refills: 0 | Status: SHIPPED | OUTPATIENT
Start: 2024-07-16

## 2024-08-14 ENCOUNTER — ANCILLARY PROCEDURE (OUTPATIENT)
Dept: GENERAL RADIOLOGY | Facility: CLINIC | Age: 29
End: 2024-08-14
Attending: PHYSICIAN ASSISTANT
Payer: COMMERCIAL

## 2024-08-14 ENCOUNTER — OFFICE VISIT (OUTPATIENT)
Dept: RHEUMATOLOGY | Facility: CLINIC | Age: 29
End: 2024-08-14
Payer: COMMERCIAL

## 2024-08-14 VITALS
OXYGEN SATURATION: 100 % | HEART RATE: 89 BPM | RESPIRATION RATE: 18 BRPM | DIASTOLIC BLOOD PRESSURE: 85 MMHG | WEIGHT: 177.45 LBS | BODY MASS INDEX: 23.52 KG/M2 | HEIGHT: 73 IN | TEMPERATURE: 97.2 F | SYSTOLIC BLOOD PRESSURE: 145 MMHG

## 2024-08-14 DIAGNOSIS — R70.0 ELEVATED SED RATE: ICD-10-CM

## 2024-08-14 DIAGNOSIS — M19.90 INFLAMMATORY ARTHRITIS: ICD-10-CM

## 2024-08-14 DIAGNOSIS — R76.8 CYCLIC CITRULLINATED PEPTIDE (CCP) ANTIBODY POSITIVE: ICD-10-CM

## 2024-08-14 DIAGNOSIS — R79.82 ELEVATED C-REACTIVE PROTEIN (CRP): ICD-10-CM

## 2024-08-14 DIAGNOSIS — M19.90 INFLAMMATORY ARTHRITIS: Primary | ICD-10-CM

## 2024-08-14 LAB
CRP SERPL-MCNC: 76.76 MG/L
ERYTHROCYTE [SEDIMENTATION RATE] IN BLOOD BY WESTERGREN METHOD: 64 MM/HR (ref 0–15)

## 2024-08-14 PROCEDURE — 73130 X-RAY EXAM OF HAND: CPT | Mod: TC | Performed by: RADIOLOGY

## 2024-08-14 PROCEDURE — 99204 OFFICE O/P NEW MOD 45 MIN: CPT | Performed by: PHYSICIAN ASSISTANT

## 2024-08-14 PROCEDURE — 81374 HLA I TYPING 1 ANTIGEN LR: CPT | Performed by: PHYSICIAN ASSISTANT

## 2024-08-14 PROCEDURE — 85652 RBC SED RATE AUTOMATED: CPT | Performed by: PHYSICIAN ASSISTANT

## 2024-08-14 PROCEDURE — 72070 X-RAY EXAM THORAC SPINE 2VWS: CPT | Mod: TC | Performed by: RADIOLOGY

## 2024-08-14 PROCEDURE — 73630 X-RAY EXAM OF FOOT: CPT | Mod: TC | Performed by: RADIOLOGY

## 2024-08-14 PROCEDURE — 86140 C-REACTIVE PROTEIN: CPT | Performed by: PHYSICIAN ASSISTANT

## 2024-08-14 PROCEDURE — 36415 COLL VENOUS BLD VENIPUNCTURE: CPT | Performed by: PHYSICIAN ASSISTANT

## 2024-08-14 RX ORDER — PREDNISONE 5 MG/1
TABLET ORAL
Qty: 32 TABLET | Refills: 0 | Status: SHIPPED | OUTPATIENT
Start: 2024-08-14 | End: 2024-09-04

## 2024-08-14 ASSESSMENT — PAIN SCALES - GENERAL: PAINLEVEL: EXTREME PAIN (8)

## 2024-08-14 NOTE — PATIENT INSTRUCTIONS
After Visit Instructions:     Thank you for coming to [facility] for your care. It is my goal to partner with you to help you reach your optimal state of health.       Plan:     Schedule follow-up with Drea Felix PA-C in 1 months. Okay to be a video visit.   Imaging: xray hands, feet and thoracic spine  Labs: CRP, Sed Rate, HLA-B27   Medication recommendations:   Prednisone 5mg: Take 10 mg (2 tablets) daily x1 week, then take 7.5 mg (1.5 tablets) daily x1 week, then take 5 mg (1 tablet) daily x1 week then stop.  Take with food.  # Prednisone Risks and Benefits: The risks and benefits of prednisone were discussed in detail and the patient verbalized understanding.  The risks discussed include, but are not limited to, weight gain, fluid retention, impaired wound healing, hyperglycemia, adrenal suppression, GI upset, peptic ulcer, hepatotoxicity, aseptic necrosis of the femoral and humeral heads, osteoporosis, myopathy, tendon rupture (particularly Achilles tendon), ocular changes including an increased intraocular pressure.  I encouraged reviewing the package insert and asking any questions about the medication.          Drea Felix PA-C  [facility]  [facility]    Contact information: [facility]  Clinic Number:  [phone]  Please call or send a [employer] message with any questions about your care

## 2024-08-14 NOTE — PROGRESS NOTES
Rheumatology Clinic Visit  [facility]  HU[facility] Cardenas     Mario Raines MRN# [id number]   YOB: 1995 Age: 28 year old   Date of Visit: 08/14/2024  Primary care provider: Aziza Caal          Assessment and Plan:     Inflammatory arthritis  CCP antibody positive  Elevated CRP  Elevated sed rate    Patient presents today for an initial evaluation.  He states that he has had much more severe pain for approximately 8 months but states that he has had joint pain periodically for the last couple of years.  He states the pain initially started with his left hip and approximately 8 years ago he had plantar fasciitis in his left foot this has since progressed.  He states he now has pain in both of his feet.  It is difficult to walk due to the pain in his feet.  His right ankle has been more bothersome.  He has some occasional pain in his wrists and in his fingers.  He also notices pain in his middle back that at times he can noticed that pain when he takes a deep breath.  The pain will at times wake him up at night.  Sometimes he just needs to readjust other times he does need to get out of bed and start stretching in order for the pain to her resolved.  He did have an episode of something with his eye 6 years ago but is unsure what it was.  He states he had a lot of eye pain and he did need eyedrops but he is not sure what the eyedrops were.  He states he also had his eye covered.  Physical examination today showed some tenderness to palpation over the left big toe otherwise there was no active synovitis or dactylitis.  Full fist formation.  Some mild tenderness to palpation along the midline spine in the thoracic area.  No tenderness to palpation over his bilateral SI joints.  Previous laboratory evaluations and imaging studies were reviewed, results below.    Discussed the specialty of rheumatology today.  Discussed with the patient the results of his lab test.  He did have slightly  "elevated CCP antibody.  Negative rheumatoid factor and SHANTAL.  He has very elevated CRP and sed rate.  Discussed that this indicates inflammation.  His symptoms certainly are suggestive of an inflammatory arthritis.  I do question if this could be a spondyloarthropathy versus rheumatoid arthritis.  Could potentially also be an overlap syndrome.  At this time we will check his HLA-B27 and get imaging studies.  Will give the patient a course of low-dose prednisone to taper over the next 3 weeks.  He will follow-up with me in 1 month to discuss other treatment options.    Plan:     Schedule follow-up with Drea Felix PA-C in 1 months. Okay to be a video visit.   Imaging: xray hands, feet and thoracic spine  Labs: CRP, Sed Rate, HLA-B27   Medication recommendations:   Prednisone 5mg: Take 10 mg (2 tablets) daily x1 week, then take 7.5 mg (1.5 tablets) daily x1 week, then take 5 mg (1 tablet) daily x1 week then stop.  Take with food.  # Prednisone Risks and Benefits: The risks and benefits of prednisone were discussed in detail and the patient verbalized understanding.  The risks discussed include, but are not limited to, weight gain, fluid retention, impaired wound healing, hyperglycemia, adrenal suppression, GI upset, peptic ulcer, hepatotoxicity, aseptic necrosis of the femoral and humeral heads, osteoporosis, myopathy, tendon rupture (particularly Achilles tendon), ocular changes including an increased intraocular pressure.  I encouraged reviewing the package insert and asking any questions about the medication.      Drea Felix, HU[facility]  Rheumatology         History of Present Illness:   Mario Raines presents for evaluation of joint pain.      He reports that he has had \"bad\" pain for about 8 months but may have been present for a couple years. It started with his left hip. 8 months ago he had plantar fasciitis in his left foot. His feet are in a lot of pain daily. He states that his right ankle has some pain. He " has some pain in his upper mid back. This has been ongoing for about 6 months. 3 months ago he has had some sternum pain. Within the last month his wrists and fingers have been mildly uncomfortable. No swelling.  Things tend to be worse in the mornings and right before bed. Middle of the day is better. The pain will wake him up at night and getting up and moving might help slightly. One night a week he has to fully get out of bed and stretch. No history of uveitis or iritis. He did however have something with his eye 6 years ago where he needed any eye drop but does not know what the eye drops where. He states that his eye hurt a lot and he needed an eye cover. No skin rashes. No unexplained fevers. No significant fatigue. Some fatigue from having pain. No shortness of breath.     No personal or family history of psoriasis, ulcerative colitis or crohn's.          Review of Systems:     Constitutional: negative  Skin: negative  Eyes: negative  Ears/Nose/Throat: negative  Respiratory: No shortness of breath, dyspnea on exertion, cough, or hemoptysis  Cardiovascular: negative  Gastrointestinal: negative  Genitourinary: negative  Musculoskeletal: as above  Neurologic: negative  Psychiatric: negative  Hematologic/Lymphatic/Immunologic: negative  Endocrine: negative         Active Problem List:     Patient Active Problem List    Diagnosis Date Noted    Polyarthralgia 06/07/2024     Priority: Medium    Generalized anxiety disorder 12/23/2019     Priority: Medium    JIMBO (obstructive sleep apnea) 05/27/2014     Priority: Medium     Very severe JIMBO (5/26/2014 with AHI ~150, demetri desat 84%, CPAP 9 optimal)        Moderate major depression (H) 10/08/2012     Priority: Medium     Currently being treated by Dr. Rick Hanna at [facility] in [place]. [phone]      Insomnia 08/02/2011     Priority: Medium            Past Medical History:     Past Medical History:   Diagnosis Date    Acne 11/11/2008 November 11, 2008 -   Information given about the nature of acne and treatment.  OTC treatments first. Recheck in 4 months      Ankle sprain 12/01/2010    Closed displaced fracture of neck of fifth metacarpal bone of right hand 06/02/2017    Common wart 05/07/2014    Deliberate self-cutting 08/02/2011    See psychology or school counselor back for routine follow-up.  Return immediately for depressed mood or anxiety      Depression     Depressive disorder 2012    Eczema 11/11/2008 November 11, 2008- Around mouth. Eucerin or vanicream.  !%hydrocortisone cream if needed.          Fifth Metatarsal Bone Styloid/avulsion fracture 10/26/2009    Repeat XRay 1 week.  CAM walker for 4 weeks. Crutches for first 2.  After 4 weeks recheck XRay.  Then theraband, proprioception...      Health Care Home 10/15/2011    EMERGENCY CARE PLAN  March 14, 2013: Ethel Care Coordinator, is available at [phone].             Presenting Problem    Signs and Symptoms    Treatment Plan      Questions or concerns   during clinic hours         I will call my clinic directly:  [place] [address]  [phone].      Questions or concerns outside clinic hours         I     Obesity (BMI 35.0-39.9) with comorbidity (H) 03/04/2019    Panic attack 05/15/2017    Self mutilation     Vitamin D deficiency 04/03/2018     Past Surgical History:   Procedure Laterality Date    NO HISTORY OF SURGERY              Social History:     Social History     Socioeconomic History    Marital status: Single     Spouse name: Not on file    Number of children: Not on file    Years of education: Not on file    Highest education level: Not on file   Occupational History    Not on file   Tobacco Use    Smoking status: Former     Current packs/day: 0.00     Types: Cigarettes    Smokeless tobacco: Former     Types: Snuff   Vaping Use    Vaping status: Never Used   Substance and Sexual Activity    Alcohol use: Yes     Comment: occasional     Drug use: No     Comment: past-marijuana and meth    Sexual activity: Not Currently     Partners: Female     Comment: girlfriend on OCP   Other Topics Concern    Parent/sibling w/ CABG, MI or angioplasty before 65F 55M? Not Asked   Social History Narrative    Not on file     Social Determinants of Health     Financial Resource Strain: Low Risk  (5/31/2024)    Financial Resource Strain     Within the past 12 months, have you or your family members you live with been unable to get utilities (heat, electricity) when it was really needed?: No   Food Insecurity: Low Risk  (5/31/2024)    Food Insecurity     Within the past 12 months, did you worry that your food would run out before you got money to buy more?: No     Within the past 12 months, did the food you bought just not last and you didn t have money to get more?: No   Transportation Needs: Low Risk  (5/31/2024)    Transportation Needs     Within the past 12 months, has lack of transportation kept you from medical appointments, getting your medicines, non-medical meetings or appointments, work, or from getting things that you need?: No   Physical Activity: Sufficiently Active (5/31/2024)    Exercise Vital Sign     Days of Exercise per Week: 5 days     Minutes of Exercise per Session: 30 min   Stress: Stress Concern Present (5/31/2024)    [employer] - Occupational Stress Questionnaire     Feeling of Stress : To some extent   Social Connections: Unknown (5/31/2024)    Social Connection and Isolation Panel [NHANES]     Frequency of Communication with Friends and Family: Not on file     Frequency of Social Gatherings with Friends and Family: Twice a week     Attends Zoroastrian Services: Not on file     Active Member of Clubs or Organizations: Not on file     Attends Club or Organization Meetings: Not on file     Marital Status: Not on file   Interpersonal Safety: Low Risk  (6/5/2024)    Interpersonal Safety     Do you feel physically and  "emotionally safe where you currently live?: Yes     Within the past 12 months, have you been hit, slapped, kicked or otherwise physically hurt by someone?: No     Within the past 12 months, have you been humiliated or emotionally abused in other ways by your partner or ex-partner?: No   Housing Stability: Low Risk  (5/31/2024)    Housing Stability     Do you have housing? : Yes     Are you worried about losing your housing?: No          Family History:     Family History   Problem Relation Age of Onset    Hypertension Maternal Grandmother     Hypertension Maternal Grandfather     Family History Negative Mother     Family History Negative Father     Family History Negative Paternal Grandmother     Heart Disease Paternal Grandfather         heart disease    Diabetes No family hx of     Cerebrovascular Disease No family hx of     Breast Cancer No family hx of     Cancer - colorectal No family hx of     Prostate Cancer No family hx of     C.A.D. No family hx of             Allergies:   No Known Allergies         Medications:     Current Outpatient Medications   Medication Sig Dispense Refill    ferrous sulfate (FEROSUL) 325 (65 Fe) MG tablet Take 1 tablet (325 mg) by mouth daily (with breakfast) 90 tablet 0    naproxen (NAPROSYN) 500 MG tablet Take 0.5-1 tablets (250-500 mg) by mouth 2 times daily (with meals) (Patient taking differently: Take 250-500 mg by mouth 2 times daily as needed) 60 tablet 0    predniSONE (DELTASONE) 5 MG tablet Take 2 tablets (10 mg) by mouth daily for 7 days, THEN 1.5 tablets (7.5 mg) daily for 7 days, THEN 1 tablet (5 mg) daily for 7 days. Take with food. 32 tablet 0            Physical Exam:   Blood pressure (!) 145/85, pulse 89, temperature 97.2  F (36.2  C), temperature source Tympanic, resp. rate 18, height 1.86 m (6' 1.23\"), weight 80.5 kg (177 lb 7.2 oz), SpO2 100%.  Wt Readings from Last 6 Encounters:   08/14/24 80.5 kg (177 lb 7.2 oz)   06/21/24 84.6 kg (186 lb 9.6 oz)   06/05/24 85.7 " kg (189 lb)   11/30/23 101.2 kg (223 lb)   10/21/21 95.4 kg (210 lb 6 oz)   08/06/21 97.5 kg (215 lb)     Constitutional: well-developed, appearing stated age; cooperative  Eyes: nl PERRLA, conjunctiva, sclera  ENT: nl external ears, nose, hearing, lips, teeth, gums, throat. No mucositis.   No mucous membrane lesions, normal saliva pool  Neck: no mass or thyroid enlargement  Resp: No shortness of breath with normal conversation  Lymph: no cervical, supraclavicular or epitrochlear nodes  MS: The TMJ, neck, shoulder, elbow, wrist, MCP/PIP/DIP, spine, knee, ankle, and foot MTP/IP joints were examined and found normal. No active synovitis or altered joint anatomy. Full joint ROM. Normal  strength. No dactylitis,  tenosynovitis, enthesopathy.  Tenderness to palpation over the midline spine in the thoracic region.  Tenderness over the left first MTP.  Skin: no nail pitting, alopecia, rash, nodules or lesions.   Psych: nl judgement, orientation, memory, affect.           Data:   Imaging:  Chest x-ray 6/5/2024  IMPRESSION: No gross sternal abnormality demonstrated. There are no  acute infiltrates. The cardiac silhouette is not enlarged. Pulmonary  vasculature is unremarkable.    Laboratory:  6/5/2024  Creatinine 0.68, GFR greater than 90  Albumin 4.3  ALT 16, AST 22  CRP 67.8  TSH 1.05  Vitamin D 69  WBC count 8.0, hemoglobin 12.7, platelet count 418  Sed rate 56    6/8/2024  SHANTAL negative  Rheumatoid factor less than 10  CCP antibody 46    7/5/2024  CRP 47.2  CCP antibody 38.0  Iron 22  Sed rate 47

## 2024-08-16 LAB
B LOCUS: NORMAL
B27TEST METHOD: NORMAL

## 2024-08-27 ENCOUNTER — PATIENT OUTREACH (OUTPATIENT)
Dept: CARE COORDINATION | Facility: CLINIC | Age: 29
End: 2024-08-27
Payer: COMMERCIAL

## 2024-08-28 NOTE — PROGRESS NOTES
Rheumatology Clinic Visit  [facility]  HUGH Cardenasdann Raines MRN# [id number]   YOB: 1995 Age: 28 year old   Date of Visit: 9/10/2024  Primary care provider: Aziza Caal          Assessment and Plan:     Inflammatory arthritis  CCP antibody positive  Elevated CRP  Elevated sed rate  High Risk medication use    Patient presents today for follow up.  He did get some benefit overall with the prednisone.  He has been off of it now and still has some improvement but is noticing some joint symptoms.  We did review the results of his workup which continued to show a significantly elevated C-reactive protein and sed rate.  He was also found to be HLA-B27 positive.  X-rays were normal/negative.  I do still wonder if this could be a spondyloarthropathy versus the seropositive rheumatoid arthritis.  Either way we will start treatment with a medication that is used to treat both.  Will start with sulfasalazine.  Side effects of this medication were reviewed.  If he continues to have pain in his foot we may need to get an MRI.  Will check labs in 1 month.  He will follow-up with me in 3 months.      The longitudinal plan of care for the diagnosis(es)/condition(s) as documented were addressed during this visit. Due to the added complexity in care, I will continue to support Mario in the subsequent management and with ongoing continuity of care.    Plan:     Schedule follow-up with Drea Felix PA-C in 3 months.   Labs: CBC, creatinine, Albumin, AST, ALT, CRP and Sed Rate in 1 month and then every 3 months after that  Medication recommendations:   Start Sulfasalazine 500mg: Take 1 tablet daily for 1 week, then take 1 tablet twice daily for 1 week, then take 1 tab in AM and 2 tabs in PM for 1 week, then take 2 tabs twice daily. Take this medication with food.  # Sulfasalazine Risks and Benefits: The risks and benefits of sulfasalazine were discussed in detail and the patient  verbalized understanding.  The risks discussed include, but are not limited to, the risk for hypersensitivity, anaphylaxis, anaphylactoid reactions, infections, bone marrow suppression,  hepatotoxicity, nausea, vomiting, and GI upset.  Oligospermia may occur in males.  I encouraged reviewing the package insert and asking any questions about the medication.     Consider Methotrexate if you do not tolerate Sulfasalazine or it is not effective. This is a medication that is taken ONCE A WEEK.   While on Methotrexate:  -check labs (ALT/AST, albumin, CBC with platelets and creatinine) every 3 months  -Limit alcohol to 2 drinks weekly  -Take Folic Acid 1mg daily   -Tylenol 500-1000mg can be used as needed up to three times daily for nausea/headache associated with dosing  # Methotrexate Risks and Benefits: The risks and benefits of methotrexate were discussed in detail and the patient verbalized understanding.  The risks discussed include, but are not limited to, the risk for hypersensitivity, anaphylaxis, anaphylactoid reactions, infections, bone marrow suppression, renal toxicity, hepatotoxicity, pulmonary toxicity, malignancy, impaired fertility, GI upset, alopecia, and oral and nasal sores.  Folic acid supplementation is recommended during methotrexate therapy to help prevent some of the side effects. Pregnancy prevention and planning was discussed; it is recommended that women of childbearing potential use reliable contraception during therapy.  The risks of taking both methotrexate and alcohol were reviewed; complete alcohol avoidance was discussed.  Routine laboratory monitoring is required during methotrexate therapy. Taking MTX once weekly, all within a 24 hour period was stressed and the patient verbalized this instruction back to me.  I encouraged reviewing the package insert and asking any questions about the medication.     Drea Felix, [facility]  Rheumatology         History of Present Illness:   Mario Raines  "presents for evaluation of joint pain.      Interval history September 10, 2024  The prednisone did seem to help his symptoms. He has not had any pain in his hips or lower back. Overall the severity was lessened. The sternum and back and foot still have been bothersome.     HPI from consult of August 14, 2024:  He reports that he has had \"bad\" pain for about 8 months but may have been present for a couple years. It started with his left hip. 8 months ago he had plantar fasciitis in his left foot. His feet are in a lot of pain daily. He states that his right ankle has some pain. He has some pain in his upper mid back. This has been ongoing for about 6 months. 3 months ago he has had some sternum pain. Within the last month his wrists and fingers have been mildly uncomfortable. No swelling.  Things tend to be worse in the mornings and right before bed. Middle of the day is better. The pain will wake him up at night and getting up and moving might help slightly. One night a week he has to fully get out of bed and stretch. No history of uveitis or iritis. He did however have something with his eye 6 years ago where he needed any eye drop but does not know what the eye drops where. He states that his eye hurt a lot and he needed an eye cover. No skin rashes. No unexplained fevers. No significant fatigue. Some fatigue from having pain. No shortness of breath.     No personal or family history of psoriasis, ulcerative colitis or crohn's.          Review of Systems:     Constitutional: negative  Skin: negative  Eyes: negative  Ears/Nose/Throat: negative  Respiratory: No shortness of breath, dyspnea on exertion, cough, or hemoptysis  Cardiovascular: negative  Gastrointestinal: negative  Genitourinary: negative  Musculoskeletal: as above  Neurologic: negative  Psychiatric: negative  Hematologic/Lymphatic/Immunologic: negative  Endocrine: negative         Active Problem List:     Patient Active Problem List    Diagnosis Date " Noted    Polyarthralgia 06/07/2024     Priority: Medium    Generalized anxiety disorder 12/23/2019     Priority: Medium    JIMBO (obstructive sleep apnea) 05/27/2014     Priority: Medium     Very severe JIMBO (5/26/2014 with AHI ~150, demetri desat 84%, CPAP 9 optimal)        Moderate major depression (H) 10/08/2012     Priority: Medium     Currently being treated by Dr. Rick Hanna at [facility] in [place]. [phone]      Insomnia 08/02/2011     Priority: Medium            Past Medical History:     Past Medical History:   Diagnosis Date    Acne 11/11/2008 November 11, 2008 -  Information given about the nature of acne and treatment.  OTC treatments first. Recheck in 4 months      Ankle sprain 12/01/2010    Closed displaced fracture of neck of fifth metacarpal bone of right hand 06/02/2017    Common wart 05/07/2014    Deliberate self-cutting 08/02/2011    See psychology or school counselor back for routine follow-up.  Return immediately for depressed mood or anxiety      Depression     Depressive disorder 2012    Eczema 11/11/2008 November 11, 2008- Around mouth. Eucerin or vanicream.  !%hydrocortisone cream if needed.          Fifth Metatarsal Bone Styloid/avulsion fracture 10/26/2009    Repeat XRay 1 week.  CAM walker for 4 weeks. Crutches for first 2.  After 4 weeks recheck XRay.  Then theraband, proprioception...      Health Care Home 10/15/2011    EMERGENCY CARE PLAN  March 14, 2013: Ethel Care Coordinator, is available at [phone].             Presenting Problem    Signs and Symptoms    Treatment Plan      Questions or concerns   during clinic hours         I will call my clinic directly:  [address] [address]  [phone].      Questions or concerns outside clinic hours         I     Obesity (BMI 35.0-39.9) with comorbidity (H) 03/04/2019    Panic attack 05/15/2017    Self mutilation     Vitamin D deficiency 04/03/2018     Past Surgical  History:   Procedure Laterality Date    NO HISTORY OF SURGERY              Social History:     Social History     Socioeconomic History    Marital status: Single     Spouse name: Not on file    Number of children: Not on file    Years of education: Not on file    Highest education level: Not on file   Occupational History    Not on file   Tobacco Use    Smoking status: Former     Current packs/day: 0.00     Types: Cigarettes    Smokeless tobacco: Former     Types: Snuff   Vaping Use    Vaping status: Never Used   Substance and Sexual Activity    Alcohol use: Yes     Comment: occasional    Drug use: No     Comment: past-marijuana and meth    Sexual activity: Not Currently     Partners: Female     Comment: girlfriend on OCP   Other Topics Concern    Parent/sibling w/ CABG, MI or angioplasty before 65F 55M? Not Asked   Social History Narrative    Not on file     Social Determinants of Health     Financial Resource Strain: Low Risk  (5/31/2024)    Financial Resource Strain     Within the past 12 months, have you or your family members you live with been unable to get utilities (heat, electricity) when it was really needed?: No   Food Insecurity: Low Risk  (5/31/2024)    Food Insecurity     Within the past 12 months, did you worry that your food would run out before you got money to buy more?: No     Within the past 12 months, did the food you bought just not last and you didn t have money to get more?: No   Transportation Needs: Low Risk  (5/31/2024)    Transportation Needs     Within the past 12 months, has lack of transportation kept you from medical appointments, getting your medicines, non-medical meetings or appointments, work, or from getting things that you need?: No   Physical Activity: Sufficiently Active (5/31/2024)    Exercise Vital Sign     Days of Exercise per Week: 5 days     Minutes of Exercise per Session: 30 min   Stress: Stress Concern Present (5/31/2024)    [employer] -  Occupational Stress Questionnaire     Feeling of Stress : To some extent   Social Connections: Unknown (5/31/2024)    Social Connection and Isolation Panel [NHANES]     Frequency of Communication with Friends and Family: Not on file     Frequency of Social Gatherings with Friends and Family: Twice a week     Attends Oriental orthodox Services: Not on file     Active Member of Clubs or Organizations: Not on file     Attends Club or Organization Meetings: Not on file     Marital Status: Not on file   Interpersonal Safety: Low Risk  (6/5/2024)    Interpersonal Safety     Do you feel physically and emotionally safe where you currently live?: Yes     Within the past 12 months, have you been hit, slapped, kicked or otherwise physically hurt by someone?: No     Within the past 12 months, have you been humiliated or emotionally abused in other ways by your partner or ex-partner?: No   Housing Stability: Low Risk  (5/31/2024)    Housing Stability     Do you have housing? : Yes     Are you worried about losing your housing?: No          Family History:     Family History   Problem Relation Age of Onset    Hypertension Maternal Grandmother     Hypertension Maternal Grandfather     Family History Negative Mother     Family History Negative Father     Family History Negative Paternal Grandmother     Heart Disease Paternal Grandfather         heart disease    Diabetes No family hx of     Cerebrovascular Disease No family hx of     Breast Cancer No family hx of     Cancer - colorectal No family hx of     Prostate Cancer No family hx of     C.A.D. No family hx of             Allergies:   No Known Allergies         Medications:     Current Outpatient Medications   Medication Sig Dispense Refill    ferrous sulfate (FEROSUL) 325 (65 Fe) MG tablet Take 1 tablet (325 mg) by mouth daily (with breakfast) 90 tablet 0    naproxen (NAPROSYN) 500 MG tablet Take 0.5-1 tablets (250-500 mg) by mouth 2 times daily (with meals) (Patient taking  "differently: Take 250-500 mg by mouth 2 times daily as needed) 60 tablet 0            Physical Exam:   Blood pressure 127/80, pulse 86, temperature 97.5  F (36.4  C), temperature source Tympanic, resp. rate 16, height 1.86 m (6' 1.23\"), weight 78.1 kg (172 lb 3.2 oz), SpO2 100%.  Wt Readings from Last 6 Encounters:   08/14/24 80.5 kg (177 lb 7.2 oz)   06/21/24 84.6 kg (186 lb 9.6 oz)   06/05/24 85.7 kg (189 lb)   11/30/23 101.2 kg (223 lb)   10/21/21 95.4 kg (210 lb 6 oz)   08/06/21 97.5 kg (215 lb)     Constitutional: well-developed, appearing stated age; cooperative  Eyes: nl conjunctiva, sclera  ENT: nl external ears, nose, hearing, lips,   Neck: no mass or thyroid enlargement  Resp: No shortness of breath with normal conversation  Lymph: no cervical, supraclavicular or epitrochlear nodes  Psych: nl judgement, orientation, memory, affect.           Data:   Imaging:  Chest x-ray 6/5/2024  IMPRESSION: No gross sternal abnormality demonstrated. There are no  acute infiltrates. The cardiac silhouette is not enlarged. Pulmonary  vasculature is unremarkable.    X-ray thoracic spine 8/14/2024  IMPRESSION: There is normal alignment of the thoracic vertebrae.  Vertebral body heights of the thoracic spine are normal. No evidence  for fracture. No significant degenerative change.    X-ray bilateral foot 8/14/2024   IMPRESSION: Both feet negative for fracture or dislocation. No erosive  changes are identified. Slight plantar calcaneal spurring on the left.    x-ray bilateral hand 8/14/2024  IMPRESSION: Old healed fracture of the right fifth metacarpal. No  evidence for acute fracture on either side. No erosive changes on  either side.      Laboratory:  6/5/2024  Creatinine 0.68, GFR greater than 90  Albumin 4.3  ALT 16, AST 22  CRP 67.8  TSH 1.05  Vitamin D 69  WBC count 8.0, hemoglobin 12.7, platelet count 418  Sed rate 56    6/8/2024  SHANTAL negative  Rheumatoid factor less than 10  CCP antibody 46    7/5/2024  CRP 47.2  CCP " antibody 38.0  Iron 22  Sed rate 47    8/14/2024  CRP 76.76  Sed rate 64  HLA-B27 positive

## 2024-09-10 ENCOUNTER — OFFICE VISIT (OUTPATIENT)
Dept: RHEUMATOLOGY | Facility: CLINIC | Age: 29
End: 2024-09-10
Payer: COMMERCIAL

## 2024-09-10 VITALS
HEART RATE: 86 BPM | TEMPERATURE: 97.5 F | BODY MASS INDEX: 22.82 KG/M2 | WEIGHT: 172.2 LBS | HEIGHT: 73 IN | SYSTOLIC BLOOD PRESSURE: 127 MMHG | OXYGEN SATURATION: 100 % | RESPIRATION RATE: 16 BRPM | DIASTOLIC BLOOD PRESSURE: 80 MMHG

## 2024-09-10 DIAGNOSIS — Z79.899 HIGH RISK MEDICATION USE: ICD-10-CM

## 2024-09-10 DIAGNOSIS — R70.0 ELEVATED SED RATE: ICD-10-CM

## 2024-09-10 DIAGNOSIS — R76.8 CYCLIC CITRULLINATED PEPTIDE (CCP) ANTIBODY POSITIVE: ICD-10-CM

## 2024-09-10 DIAGNOSIS — M19.90 INFLAMMATORY ARTHRITIS: Primary | ICD-10-CM

## 2024-09-10 DIAGNOSIS — R79.82 ELEVATED C-REACTIVE PROTEIN (CRP): ICD-10-CM

## 2024-09-10 PROCEDURE — G2211 COMPLEX E/M VISIT ADD ON: HCPCS | Performed by: PHYSICIAN ASSISTANT

## 2024-09-10 PROCEDURE — 99214 OFFICE O/P EST MOD 30 MIN: CPT | Performed by: PHYSICIAN ASSISTANT

## 2024-09-10 RX ORDER — SULFASALAZINE 500 MG/1
TABLET, DELAYED RELEASE ORAL
Qty: 360 TABLET | Refills: 0 | Status: SHIPPED | OUTPATIENT
Start: 2024-09-10

## 2024-09-10 ASSESSMENT — PAIN SCALES - GENERAL: PAINLEVEL: MODERATE PAIN (4)

## 2024-09-10 NOTE — PATIENT INSTRUCTIONS
After Visit Instructions:     Thank you for coming to [facility] Rheumatology for your care. It is my goal to partner with you to help you reach your optimal state of health.       Plan:     Schedule follow-up with Drea Felix PA-C in 3 months.   Labs: CBC, creatinine, Albumin, AST, ALT, CRP and Sed Rate in 1 month and then every 3 months after that  Medication recommendations:   Start Sulfasalazine 500mg: Take 1 tablet daily for 1 week, then take 1 tablet twice daily for 1 week, then take 1 tab in AM and 2 tabs in PM for 1 week, then take 2 tabs twice daily. Take this medication with food.  # Sulfasalazine Risks and Benefits: The risks and benefits of sulfasalazine were discussed in detail and the patient verbalized understanding.  The risks discussed include, but are not limited to, the risk for hypersensitivity, anaphylaxis, anaphylactoid reactions, infections, bone marrow suppression,  hepatotoxicity, nausea, vomiting, and GI upset.  Oligospermia may occur in males.  I encouraged reviewing the package insert and asking any questions about the medication.     Consider Methotrexate if you do not tolerate Sulfasalazine or it is not effective. This is a medication that is taken ONCE A WEEK.   While on Methotrexate:  -check labs (ALT/AST, albumin, CBC with platelets and creatinine) every 3 months  -Limit alcohol to 2 drinks weekly  -Take Folic Acid 1mg daily   -Tylenol 500-1000mg can be used as needed up to three times daily for nausea/headache associated with dosing  # Methotrexate Risks and Benefits: The risks and benefits of methotrexate were discussed in detail and the patient verbalized understanding.  The risks discussed include, but are not limited to, the risk for hypersensitivity, anaphylaxis, anaphylactoid reactions, infections, bone marrow suppression, renal toxicity, hepatotoxicity, pulmonary toxicity, malignancy, impaired fertility, GI upset, alopecia, and oral and nasal sores.  Folic acid  supplementation is recommended during methotrexate therapy to help prevent some of the side effects. Pregnancy prevention and planning was discussed; it is recommended that women of childbearing potential use reliable contraception during therapy.  The risks of taking both methotrexate and alcohol were reviewed; complete alcohol avoidance was discussed.  Routine laboratory monitoring is required during methotrexate therapy. Taking MTX once weekly, all within a 24 hour period was stressed and the patient verbalized this instruction back to me.  I encouraged reviewing the package insert and asking any questions about the medication.       Drea Felix PA-C  [facility]  [facility]    Contact information: [facility]  Clinic Number:  [phone]  Please call or send a [employer] message with any questions about your care

## 2024-10-10 ENCOUNTER — LAB (OUTPATIENT)
Dept: LAB | Facility: CLINIC | Age: 29
End: 2024-10-10
Payer: COMMERCIAL

## 2024-10-10 DIAGNOSIS — R76.8 CYCLIC CITRULLINATED PEPTIDE (CCP) ANTIBODY POSITIVE: ICD-10-CM

## 2024-10-10 DIAGNOSIS — R79.82 ELEVATED C-REACTIVE PROTEIN (CRP): ICD-10-CM

## 2024-10-10 DIAGNOSIS — M19.90 INFLAMMATORY ARTHRITIS: ICD-10-CM

## 2024-10-10 DIAGNOSIS — Z79.899 HIGH RISK MEDICATION USE: ICD-10-CM

## 2024-10-10 DIAGNOSIS — R70.0 ELEVATED SED RATE: ICD-10-CM

## 2024-10-10 LAB
ERYTHROCYTE [DISTWIDTH] IN BLOOD BY AUTOMATED COUNT: 17.6 % (ref 10–15)
ERYTHROCYTE [SEDIMENTATION RATE] IN BLOOD BY WESTERGREN METHOD: 9 MM/HR (ref 0–15)
HCT VFR BLD AUTO: 44.5 % (ref 40–53)
HGB BLD-MCNC: 14.6 G/DL (ref 13.3–17.7)
MCH RBC QN AUTO: 29 PG (ref 26.5–33)
MCHC RBC AUTO-ENTMCNC: 32.8 G/DL (ref 31.5–36.5)
MCV RBC AUTO: 89 FL (ref 78–100)
PLATELET # BLD AUTO: 285 10E3/UL (ref 150–450)
RBC # BLD AUTO: 5.03 10E6/UL (ref 4.4–5.9)
WBC # BLD AUTO: 5.4 10E3/UL (ref 4–11)

## 2024-10-10 PROCEDURE — 82040 ASSAY OF SERUM ALBUMIN: CPT

## 2024-10-10 PROCEDURE — 85027 COMPLETE CBC AUTOMATED: CPT

## 2024-10-10 PROCEDURE — 36415 COLL VENOUS BLD VENIPUNCTURE: CPT

## 2024-10-10 PROCEDURE — 85652 RBC SED RATE AUTOMATED: CPT

## 2024-10-10 PROCEDURE — 84450 TRANSFERASE (AST) (SGOT): CPT

## 2024-10-10 PROCEDURE — 84460 ALANINE AMINO (ALT) (SGPT): CPT

## 2024-10-10 PROCEDURE — 82565 ASSAY OF CREATININE: CPT

## 2024-10-10 PROCEDURE — 86140 C-REACTIVE PROTEIN: CPT

## 2024-10-11 LAB
ALBUMIN SERPL BCG-MCNC: 4.3 G/DL (ref 3.5–5.2)
ALT SERPL W P-5'-P-CCNC: 25 U/L (ref 0–70)
AST SERPL W P-5'-P-CCNC: 30 U/L (ref 0–45)
CREAT SERPL-MCNC: 0.86 MG/DL (ref 0.67–1.17)
CRP SERPL-MCNC: <3 MG/L
EGFRCR SERPLBLD CKD-EPI 2021: >90 ML/MIN/1.73M2

## 2024-11-24 ENCOUNTER — MYC REFILL (OUTPATIENT)
Dept: RHEUMATOLOGY | Facility: CLINIC | Age: 29
End: 2024-11-24
Payer: COMMERCIAL

## 2024-11-24 DIAGNOSIS — R70.0 ELEVATED SED RATE: ICD-10-CM

## 2024-11-24 DIAGNOSIS — Z79.899 HIGH RISK MEDICATION USE: ICD-10-CM

## 2024-11-24 DIAGNOSIS — R76.8 CYCLIC CITRULLINATED PEPTIDE (CCP) ANTIBODY POSITIVE: ICD-10-CM

## 2024-11-24 DIAGNOSIS — M19.90 INFLAMMATORY ARTHRITIS: ICD-10-CM

## 2024-11-24 DIAGNOSIS — R79.82 ELEVATED C-REACTIVE PROTEIN (CRP): ICD-10-CM

## 2024-11-25 RX ORDER — SULFASALAZINE 500 MG/1
1000 TABLET, DELAYED RELEASE ORAL 2 TIMES DAILY
Qty: 120 TABLET | Refills: 0 | Status: SHIPPED | OUTPATIENT
Start: 2024-11-25

## 2024-12-04 NOTE — PROGRESS NOTES
"Rheumatology Clinic Visit  [facility]  HU[facility] Cardenas     Mario Raines MRN# [id number]   YOB: 1995 Age: 29 year old   Date of Visit: 12/9/24  Primary care provider: Aziza Caal          Assessment and Plan:     Inflammatory arthritis  CCP antibody positive  High Risk medication use    Patient presents today for follow up.  He states that he is feeling significantly better.  The sulfasalazine has been significantly helpful for him.  He is no longer having the joint pain that he was having.  His inflammatory markers have normalized.  He tolerates the medication well without any GI upset.  Labs have been normal as well.  At this time we will continue his current treatment regimen.  Continue to monitor labs every 3 months, next due in mid January.  He will follow-up with me in 4 months, sooner if needed.     Plan:     Schedule follow-up with Drea Felix PA-C in 4 months.   Labs: CBC, creatinine, Albumin, AST, ALT, CRP and Sed Rate  every 3 months-next due mid January  Medication recommendations:   Continue Sulfasalazine 500mg: Take  2 tabs twice daily. Take this medication with food.    [facility] Cardenas  Rheumatology         History of Present Illness:   Mario Raines presents for evaluation of joint pain.    Rheumatological history:  Pertinent lab history: +CCP (38), Neg RF, HLA-B27+, Elevated Crp and sed rate, next SHANTAL  Previous medications tried: Prednisone (helpful)  Current medications: Sulfasalazine 1000mg BID       Interval history December 9, 2024:  Doing well.  Tolerating the medication well.  Not having any further joint pain.    Interval history September 10, 2024  The prednisone did seem to help his symptoms. He has not had any pain in his hips or lower back. Overall the severity was lessened. The sternum and back and foot still have been bothersome.     HPI from consult of August 14, 2024:  He reports that he has had \"bad\" pain for about 8 months but may have been " present for a couple years. It started with his left hip. 8 months ago he had plantar fasciitis in his left foot. His feet are in a lot of pain daily. He states that his right ankle has some pain. He has some pain in his upper mid back. This has been ongoing for about 6 months. 3 months ago he has had some sternum pain. Within the last month his wrists and fingers have been mildly uncomfortable. No swelling.  Things tend to be worse in the mornings and right before bed. Middle of the day is better. The pain will wake him up at night and getting up and moving might help slightly. One night a week he has to fully get out of bed and stretch. No history of uveitis or iritis. He did however have something with his eye 6 years ago where he needed any eye drop but does not know what the eye drops where. He states that his eye hurt a lot and he needed an eye cover. No skin rashes. No unexplained fevers. No significant fatigue. Some fatigue from having pain. No shortness of breath.     No personal or family history of psoriasis, ulcerative colitis or crohn's.          Review of Systems:     Constitutional: negative  Skin: negative  Eyes: negative  Ears/Nose/Throat: negative  Respiratory: No shortness of breath, dyspnea on exertion, cough, or hemoptysis  Cardiovascular: negative  Gastrointestinal: negative  Genitourinary: negative  Musculoskeletal: as above  Neurologic: negative  Psychiatric: negative  Hematologic/Lymphatic/Immunologic: negative  Endocrine: negative         Active Problem List:     Patient Active Problem List    Diagnosis Date Noted    Polyarthralgia 06/07/2024     Priority: Medium    Generalized anxiety disorder 12/23/2019     Priority: Medium    JIMBO (obstructive sleep apnea) 05/27/2014     Priority: Medium     Very severe JIMBO (5/26/2014 with AHI ~150, demetri desat 84%, CPAP 9 optimal)        Moderate major depression (H) 10/08/2012     Priority: Medium     Currently being treated by Dr. Rick Hanna at  [facility] in [place]. [phone]      Insomnia 08/02/2011     Priority: Medium            Past Medical History:     Past Medical History:   Diagnosis Date    Acne 11/11/2008 November 11, 2008 -  Information given about the nature of acne and treatment.  OTC treatments first. Recheck in 4 months      Ankle sprain 12/01/2010    Closed displaced fracture of neck of fifth metacarpal bone of right hand 06/02/2017    Common wart 05/07/2014    Deliberate self-cutting 08/02/2011    See psychology or school counselor back for routine follow-up.  Return immediately for depressed mood or anxiety      Depression     Depressive disorder 2012    Eczema 11/11/2008 November 11, 2008- Around mouth. Eucerin or vanicream.  !%hydrocortisone cream if needed.          Fifth Metatarsal Bone Styloid/avulsion fracture 10/26/2009    Repeat XRay 1 week.  CAM walker for 4 weeks. Crutches for first 2.  After 4 weeks recheck XRay.  Then theraband, proprioception...      Health Care Home 10/15/2011    EMERGENCY CARE PLAN  March 14, 2013: Ethel Care Coordinator, is available at [phone].             Presenting Problem    Signs and Symptoms    Treatment Plan      Questions or concerns   during clinic hours         I will call my clinic directly:  [place] [address]  [phone].      Questions or concerns outside clinic hours         I     Obesity (BMI 35.0-39.9) with comorbidity (H) 03/04/2019    Panic attack 05/15/2017    Self mutilation     Vitamin D deficiency 04/03/2018     Past Surgical History:   Procedure Laterality Date    NO HISTORY OF SURGERY              Social History:     Social History     Socioeconomic History    Marital status: Single     Spouse name: Not on file    Number of children: Not on file    Years of education: Not on file    Highest education level: Not on file   Occupational History    Not on file   Tobacco Use    Smoking status: Former     Current  packs/day: 0.00     Types: Cigarettes     Passive exposure: Never    Smokeless tobacco: Former     Types: Snuff   Vaping Use    Vaping status: Never Used   Substance and Sexual Activity    Alcohol use: Yes     Comment: occasional    Drug use: No     Comment: past-marijuana and meth    Sexual activity: Not Currently     Partners: Female     Comment: girlfriend on OCP   Other Topics Concern    Parent/sibling w/ CABG, MI or angioplasty before 65F 55M? Not Asked   Social History Narrative    Not on file     Social Drivers of Health     Financial Resource Strain: Low Risk  (5/31/2024)    Financial Resource Strain     Within the past 12 months, have you or your family members you live with been unable to get utilities (heat, electricity) when it was really needed?: No   Food Insecurity: Low Risk  (5/31/2024)    Food Insecurity     Within the past 12 months, did you worry that your food would run out before you got money to buy more?: No     Within the past 12 months, did the food you bought just not last and you didn t have money to get more?: No   Transportation Needs: Low Risk  (5/31/2024)    Transportation Needs     Within the past 12 months, has lack of transportation kept you from medical appointments, getting your medicines, non-medical meetings or appointments, work, or from getting things that you need?: No   Physical Activity: Sufficiently Active (5/31/2024)    Exercise Vital Sign     Days of Exercise per Week: 5 days     Minutes of Exercise per Session: 30 min   Stress: Stress Concern Present (5/31/2024)    [employer] - Occupational Stress Questionnaire     Feeling of Stress : To some extent   Social Connections: Unknown (5/31/2024)    Social Connection and Isolation Panel [NHANES]     Frequency of Communication with Friends and Family: Not on file     Frequency of Social Gatherings with Friends and Family: Twice a week     Attends Spiritism Services: Not on file     Active Member  of Clubs or Organizations: Not on file     Attends Club or Organization Meetings: Not on file     Marital Status: Not on file   Interpersonal Safety: Low Risk  (6/5/2024)    Interpersonal Safety     Do you feel physically and emotionally safe where you currently live?: Yes     Within the past 12 months, have you been hit, slapped, kicked or otherwise physically hurt by someone?: No     Within the past 12 months, have you been humiliated or emotionally abused in other ways by your partner or ex-partner?: No   Housing Stability: Low Risk  (5/31/2024)    Housing Stability     Do you have housing? : Yes     Are you worried about losing your housing?: No          Family History:     Family History   Problem Relation Age of Onset    Hypertension Maternal Grandmother     Hypertension Maternal Grandfather     Family History Negative Mother     Family History Negative Father     Family History Negative Paternal Grandmother     Heart Disease Paternal Grandfather         heart disease    Diabetes No family hx of     Cerebrovascular Disease No family hx of     Breast Cancer No family hx of     Cancer - colorectal No family hx of     Prostate Cancer No family hx of     C.A.D. No family hx of             Allergies:   No Known Allergies         Medications:     Current Outpatient Medications   Medication Sig Dispense Refill    ferrous sulfate (FEROSUL) 325 (65 Fe) MG tablet Take 1 tablet (325 mg) by mouth daily (with breakfast) 90 tablet 0    naproxen (NAPROSYN) 500 MG tablet Take 0.5-1 tablets (250-500 mg) by mouth 2 times daily (with meals) (Patient taking differently: Take 250-500 mg by mouth 2 times daily as needed) 60 tablet 0    sulfaSALAzine ER (AZULFIDINE EN) 500 MG EC tablet Take 2 tablets (1,000 mg) by mouth 2 times daily. Take this medication with food. 120 tablet 0            Physical Exam:   Blood pressure 123/76, pulse 61, temperature 97.4  F (36.3  C), temperature source Tympanic, resp. rate 18, height 1.86 m (6'  "1.23\"), weight 80.9 kg (178 lb 6.4 oz), SpO2 100%.  Wt Readings from Last 6 Encounters:   09/10/24 78.1 kg (172 lb 3.2 oz)   08/14/24 80.5 kg (177 lb 7.2 oz)   06/21/24 84.6 kg (186 lb 9.6 oz)   06/05/24 85.7 kg (189 lb)   11/30/23 101.2 kg (223 lb)   10/21/21 95.4 kg (210 lb 6 oz)     Constitutional: well-developed, appearing stated age; cooperative  Eyes: nl conjunctiva, sclera  ENT: nl external ears, nose, hearing, lips,   Neck: no mass or thyroid enlargement  Resp: No shortness of breath with normal conversation  MSK: No active synovitis or dactylitis.  Full fist formation.  Psych: nl judgement, orientation, memory, affect.           Data:   Imaging:  Chest x-ray 6/5/2024  IMPRESSION: No gross sternal abnormality demonstrated. There are no  acute infiltrates. The cardiac silhouette is not enlarged. Pulmonary  vasculature is unremarkable.    X-ray thoracic spine 8/14/2024  IMPRESSION: There is normal alignment of the thoracic vertebrae.  Vertebral body heights of the thoracic spine are normal. No evidence  for fracture. No significant degenerative change.    X-ray bilateral foot 8/14/2024   IMPRESSION: Both feet negative for fracture or dislocation. No erosive  changes are identified. Slight plantar calcaneal spurring on the left.    x-ray bilateral hand 8/14/2024  IMPRESSION: Old healed fracture of the right fifth metacarpal. No  evidence for acute fracture on either side. No erosive changes on  either side.      Laboratory:  6/5/2024  Creatinine 0.68, GFR greater than 90  Albumin 4.3  ALT 16, AST 22  CRP 67.8  TSH 1.05  Vitamin D 69  WBC count 8.0, hemoglobin 12.7, platelet count 418  Sed rate 56    6/8/2024  SHANTAL negative  Rheumatoid factor less than 10  CCP antibody 46    7/5/2024  CRP 47.2  CCP antibody 38.0  Iron 22  Sed rate 47    8/14/2024  CRP 76.76  Sed rate 64  HLA-B27 positive    10/10/2024  Creatinine 0.6, GFR greater than 90  Albumin 4.3  ALT 25, AST 30  CRP less than 3.0  White blood cell count 5.4, " hemoglobin 14.6, platelet count 285  Sed rate 9

## 2024-12-09 ENCOUNTER — OFFICE VISIT (OUTPATIENT)
Dept: RHEUMATOLOGY | Facility: CLINIC | Age: 29
End: 2024-12-09
Payer: COMMERCIAL

## 2024-12-09 VITALS
HEART RATE: 61 BPM | BODY MASS INDEX: 23.64 KG/M2 | HEIGHT: 73 IN | TEMPERATURE: 97.4 F | SYSTOLIC BLOOD PRESSURE: 123 MMHG | DIASTOLIC BLOOD PRESSURE: 76 MMHG | OXYGEN SATURATION: 100 % | WEIGHT: 178.4 LBS | RESPIRATION RATE: 18 BRPM

## 2024-12-09 DIAGNOSIS — R76.8 CYCLIC CITRULLINATED PEPTIDE (CCP) ANTIBODY POSITIVE: ICD-10-CM

## 2024-12-09 DIAGNOSIS — Z79.899 HIGH RISK MEDICATION USE: ICD-10-CM

## 2024-12-09 DIAGNOSIS — M19.90 INFLAMMATORY ARTHRITIS: ICD-10-CM

## 2024-12-09 PROCEDURE — 99214 OFFICE O/P EST MOD 30 MIN: CPT | Performed by: PHYSICIAN ASSISTANT

## 2024-12-09 RX ORDER — SULFASALAZINE 500 MG/1
1000 TABLET, DELAYED RELEASE ORAL 2 TIMES DAILY
Qty: 360 TABLET | Refills: 0 | Status: SHIPPED | OUTPATIENT
Start: 2024-12-09

## 2024-12-09 ASSESSMENT — PAIN SCALES - GENERAL: PAINLEVEL_OUTOF10: MILD PAIN (3)

## 2024-12-09 NOTE — PATIENT INSTRUCTIONS
After Visit Instructions:     Thank you for coming to [facility] for your care. It is my goal to partner with you to help you reach your optimal state of health.       Plan:     Schedule follow-up with Drea Felix PA-C in 4 months.   Labs: CBC, creatinine, Albumin, AST, ALT, CRP and Sed Rate  every 3 months-next due mid January  Medication recommendations:   Continue Sulfasalazine 500mg: Take  2 tabs twice daily. Take this medication with food.    Drea Felix PA-C  [facility]  [facility]    Contact information: [facility]  Clinic Number:  [phone]  Please call or send a [employer] message with any questions about your care

## 2025-04-01 NOTE — PROGRESS NOTES
Rheumatology Clinic Visit  [facility]  HUGH Cardenas     Mario Raines MRN# [id number]   YOB: 1995 Age: 29 year old   Date of Visit: 4/8/2025  Primary care provider: Aziza Caal          Assessment and Plan:     Inflammatory arthritis  CCP antibody positive  High Risk medication use    Patient presents today for follow up.  He states he continues to do quite well.  The sulfasalazine works well for his joint and he has not had any flares.  He has not had any health changes.  Physical examination today does not show any active synovitis or dactylitis.  He is overdue for labs so we will update those today.  Reminded patient that labs should be done every 3 months.  He will follow-up with me in 6 months, sooner if needed.      The longitudinal plan of care for the diagnosis(es)/condition(s) as documented were addressed during this visit. Due to the added complexity in care, I will continue to support Mario in the subsequent management and with ongoing continuity of care.    Plan:     Schedule follow-up with Drea Felix PA-C in 6 months.   Labs: CBC, creatinine, Albumin, AST, ALT, CRP and Sed Rate today and every 3 months  Medication recommendations:   Continue Sulfasalazine 500mg: Take  2 tabs twice daily. Take this medication with food.    HUGH Cardenas  Rheumatology         History of Present Illness:   Mario Raines presents for evaluation of joint pain.    Rheumatological history:  Pertinent lab history: +CCP (38), Neg RF, HLA-B27+, Elevated Crp and sed rate, next SHANTAL  Previous medications tried: Prednisone (helpful)  Current medications: Sulfasalazine 1000mg BID     Interval history April 8, 2025:  Doing well. No health changes.     Interval history December 9, 2024:  Doing well.  Tolerating the medication well.  Not having any further joint pain.    Interval history September 10, 2024  The prednisone did seem to help his symptoms. He has not had any pain in his hips  "or lower back. Overall the severity was lessened. The sternum and back and foot still have been bothersome.     HPI from consult of August 14, 2024:  He reports that he has had \"bad\" pain for about 8 months but may have been present for a couple years. It started with his left hip. 8 months ago he had plantar fasciitis in his left foot. His feet are in a lot of pain daily. He states that his right ankle has some pain. He has some pain in his upper mid back. This has been ongoing for about 6 months. 3 months ago he has had some sternum pain. Within the last month his wrists and fingers have been mildly uncomfortable. No swelling.  Things tend to be worse in the mornings and right before bed. Middle of the day is better. The pain will wake him up at night and getting up and moving might help slightly. One night a week he has to fully get out of bed and stretch. No history of uveitis or iritis. He did however have something with his eye 6 years ago where he needed any eye drop but does not know what the eye drops where. He states that his eye hurt a lot and he needed an eye cover. No skin rashes. No unexplained fevers. No significant fatigue. Some fatigue from having pain. No shortness of breath.     No personal or family history of psoriasis, ulcerative colitis or crohn's.          Review of Systems:     Constitutional: negative  Skin: negative  Eyes: negative  Ears/Nose/Throat: negative  Respiratory: No shortness of breath, dyspnea on exertion, cough, or hemoptysis  Cardiovascular: negative  Gastrointestinal: negative  Genitourinary: negative  Musculoskeletal: as above  Neurologic: negative  Psychiatric: negative  Hematologic/Lymphatic/Immunologic: negative  Endocrine: negative         Active Problem List:     Patient Active Problem List    Diagnosis Date Noted    Polyarthralgia 06/07/2024     Priority: Medium    Generalized anxiety disorder 12/23/2019     Priority: Medium    JIMBO (obstructive sleep apnea) 05/27/2014 "     Priority: Medium     Very severe JIMBO (5/26/2014 with AHI ~150, demetri desat 84%, CPAP 9 optimal)        Moderate major depression (H) 10/08/2012     Priority: Medium     Currently being treated by Dr. Rick Hanna at [facility] in [place]. [phone]      Insomnia 08/02/2011     Priority: Medium            Past Medical History:     Past Medical History:   Diagnosis Date    Acne 11/11/2008 November 11, 2008 -  Information given about the nature of acne and treatment.  OTC treatments first. Recheck in 4 months      Ankle sprain 12/01/2010    Closed displaced fracture of neck of fifth metacarpal bone of right hand 06/02/2017    Common wart 05/07/2014    Deliberate self-cutting 08/02/2011    See psychology or school counselor back for routine follow-up.  Return immediately for depressed mood or anxiety      Depression     Depressive disorder 2012    Eczema 11/11/2008 November 11, 2008- Around mouth. Eucerin or vanicream.  !%hydrocortisone cream if needed.          Fifth Metatarsal Bone Styloid/avulsion fracture 10/26/2009    Repeat XRay 1 week.  CAM walker for 4 weeks. Crutches for first 2.  After 4 weeks recheck XRay.  Then theraband, proprioception...      Health Care Home 10/15/2011    EMERGENCY CARE PLAN  March 14, 2013: Ethel Care Coordinator, is available at [phone].             Presenting Problem    Signs and Symptoms    Treatment Plan      Questions or concerns   during clinic hours         I will call my clinic directly:  [place] [address]  [phone].      Questions or concerns outside clinic hours         I     Obesity (BMI 35.0-39.9) with comorbidity (H) 03/04/2019    Panic attack 05/15/2017    Self mutilation     Vitamin D deficiency 04/03/2018     Past Surgical History:   Procedure Laterality Date    NO HISTORY OF SURGERY              Social History:     Social History     Socioeconomic History    Marital status: Single      Spouse name: Not on file    Number of children: Not on file    Years of education: Not on file    Highest education level: Not on file   Occupational History    Not on file   Tobacco Use    Smoking status: Former     Current packs/day: 0.00     Types: Cigarettes     Passive exposure: Never    Smokeless tobacco: Former     Types: Snuff   Vaping Use    Vaping status: Never Used   Substance and Sexual Activity    Alcohol use: Yes     Comment: occasional    Drug use: No     Comment: past-marijuana and meth    Sexual activity: Not Currently     Partners: Female     Comment: girlfriend on OCP   Other Topics Concern    Parent/sibling w/ CABG, MI or angioplasty before 65F 55M? Not Asked   Social History Narrative    Not on file     Social Drivers of Health     Financial Resource Strain: Low Risk  (5/31/2024)    Financial Resource Strain     Within the past 12 months, have you or your family members you live with been unable to get utilities (heat, electricity) when it was really needed?: No   Food Insecurity: Low Risk  (5/31/2024)    Food Insecurity     Within the past 12 months, did you worry that your food would run out before you got money to buy more?: No     Within the past 12 months, did the food you bought just not last and you didn t have money to get more?: No   Transportation Needs: Low Risk  (5/31/2024)    Transportation Needs     Within the past 12 months, has lack of transportation kept you from medical appointments, getting your medicines, non-medical meetings or appointments, work, or from getting things that you need?: No   Physical Activity: Sufficiently Active (5/31/2024)    Exercise Vital Sign     Days of Exercise per Week: 5 days     Minutes of Exercise per Session: 30 min   Stress: Stress Concern Present (5/31/2024)    [place] [employer] - Occupational Stress Questionnaire     Feeling of Stress : To some extent   Social Connections: Unknown (5/31/2024)    Social Connection and  "Isolation Panel [NHANES]     Frequency of Communication with Friends and Family: Not on file     Frequency of Social Gatherings with Friends and Family: Twice a week     Attends Zoroastrian Services: Not on file     Active Member of Clubs or Organizations: Not on file     Attends Club or Organization Meetings: Not on file     Marital Status: Not on file   Interpersonal Safety: Low Risk  (6/5/2024)    Interpersonal Safety     Do you feel physically and emotionally safe where you currently live?: Yes     Within the past 12 months, have you been hit, slapped, kicked or otherwise physically hurt by someone?: No     Within the past 12 months, have you been humiliated or emotionally abused in other ways by your partner or ex-partner?: No   Housing Stability: Low Risk  (5/31/2024)    Housing Stability     Do you have housing? : Yes     Are you worried about losing your housing?: No          Family History:     Family History   Problem Relation Age of Onset    Hypertension Maternal Grandmother     Hypertension Maternal Grandfather     Family History Negative Mother     Family History Negative Father     Family History Negative Paternal Grandmother     Heart Disease Paternal Grandfather         heart disease    Diabetes No family hx of     Cerebrovascular Disease No family hx of     Breast Cancer No family hx of     Cancer - colorectal No family hx of     Prostate Cancer No family hx of     C.A.D. No family hx of             Allergies:   No Known Allergies         Medications:     Current Outpatient Medications   Medication Sig Dispense Refill    sulfaSALAzine ER (AZULFIDINE EN) 500 MG EC tablet Take 2 tablets (1,000 mg) by mouth 2 times daily. Take this medication with food. 360 tablet 0            Physical Exam:   Blood pressure 117/74, pulse 67, resp. rate 16, height 1.86 m (6' 1.23\"), weight 84.9 kg (187 lb 3.2 oz), SpO2 100%.  Wt Readings from Last 6 Encounters:   12/09/24 80.9 kg (178 lb 6.4 oz)   09/10/24 78.1 kg (172 " lb 3.2 oz)   08/14/24 80.5 kg (177 lb 7.2 oz)   06/21/24 84.6 kg (186 lb 9.6 oz)   06/05/24 85.7 kg (189 lb)   11/30/23 101.2 kg (223 lb)     Constitutional: well-developed, appearing stated age; cooperative  Eyes: nl conjunctiva, sclera  ENT: nl external ears, nose, hearing, lips,   Neck: no mass or thyroid enlargement  Resp: No shortness of breath with normal conversation  MSK: No active synovitis or dactylitis.  Full fist formation.  Psych: nl judgement, orientation, memory, affect.           Data:   Imaging:  Chest x-ray 6/5/2024  IMPRESSION: No gross sternal abnormality demonstrated. There are no  acute infiltrates. The cardiac silhouette is not enlarged. Pulmonary  vasculature is unremarkable.    X-ray thoracic spine 8/14/2024  IMPRESSION: There is normal alignment of the thoracic vertebrae.  Vertebral body heights of the thoracic spine are normal. No evidence  for fracture. No significant degenerative change.    X-ray bilateral foot 8/14/2024   IMPRESSION: Both feet negative for fracture or dislocation. No erosive  changes are identified. Slight plantar calcaneal spurring on the left.    x-ray bilateral hand 8/14/2024  IMPRESSION: Old healed fracture of the right fifth metacarpal. No  evidence for acute fracture on either side. No erosive changes on  either side.      Laboratory:  6/5/2024  Creatinine 0.68, GFR greater than 90  Albumin 4.3  ALT 16, AST 22  CRP 67.8  TSH 1.05  Vitamin D 69  WBC count 8.0, hemoglobin 12.7, platelet count 418  Sed rate 56    6/8/2024  SHANTAL negative  Rheumatoid factor less than 10  CCP antibody 46    7/5/2024  CRP 47.2  CCP antibody 38.0  Iron 22  Sed rate 47    8/14/2024  CRP 76.76  Sed rate 64  HLA-B27 positive    10/10/2024  Creatinine 0.6, GFR greater than 90  Albumin 4.3  ALT 25, AST 30  CRP less than 3.0  White blood cell count 5.4, hemoglobin 14.6, platelet count 285  Sed rate 9

## 2025-04-08 ENCOUNTER — OFFICE VISIT (OUTPATIENT)
Dept: RHEUMATOLOGY | Facility: CLINIC | Age: 30
End: 2025-04-08
Payer: COMMERCIAL

## 2025-04-08 VITALS
OXYGEN SATURATION: 100 % | SYSTOLIC BLOOD PRESSURE: 117 MMHG | HEART RATE: 67 BPM | HEIGHT: 73 IN | DIASTOLIC BLOOD PRESSURE: 74 MMHG | BODY MASS INDEX: 24.81 KG/M2 | RESPIRATION RATE: 16 BRPM | WEIGHT: 187.2 LBS

## 2025-04-08 DIAGNOSIS — M19.90 INFLAMMATORY ARTHRITIS: Primary | ICD-10-CM

## 2025-04-08 DIAGNOSIS — R76.8 CYCLIC CITRULLINATED PEPTIDE (CCP) ANTIBODY POSITIVE: ICD-10-CM

## 2025-04-08 DIAGNOSIS — Z79.899 HIGH RISK MEDICATION USE: ICD-10-CM

## 2025-04-08 LAB
ALBUMIN SERPL BCG-MCNC: 4.5 G/DL (ref 3.5–5.2)
ALT SERPL W P-5'-P-CCNC: 21 U/L (ref 0–70)
AST SERPL W P-5'-P-CCNC: 29 U/L (ref 0–45)
CREAT SERPL-MCNC: 1.03 MG/DL (ref 0.67–1.17)
CRP SERPL-MCNC: <3 MG/L
EGFRCR SERPLBLD CKD-EPI 2021: >90 ML/MIN/1.73M2
ERYTHROCYTE [DISTWIDTH] IN BLOOD BY AUTOMATED COUNT: 13 % (ref 10–15)
ERYTHROCYTE [SEDIMENTATION RATE] IN BLOOD BY WESTERGREN METHOD: 7 MM/HR (ref 0–15)
HCT VFR BLD AUTO: 41.2 % (ref 40–53)
HGB BLD-MCNC: 14 G/DL (ref 13.3–17.7)
MCH RBC QN AUTO: 31.8 PG (ref 26.5–33)
MCHC RBC AUTO-ENTMCNC: 34 G/DL (ref 31.5–36.5)
MCV RBC AUTO: 94 FL (ref 78–100)
PLATELET # BLD AUTO: 276 10E3/UL (ref 150–450)
RBC # BLD AUTO: 4.4 10E6/UL (ref 4.4–5.9)
WBC # BLD AUTO: 5.8 10E3/UL (ref 4–11)

## 2025-04-08 PROCEDURE — 84450 TRANSFERASE (AST) (SGOT): CPT | Performed by: PHYSICIAN ASSISTANT

## 2025-04-08 PROCEDURE — 82040 ASSAY OF SERUM ALBUMIN: CPT | Performed by: PHYSICIAN ASSISTANT

## 2025-04-08 PROCEDURE — 1126F AMNT PAIN NOTED NONE PRSNT: CPT | Performed by: PHYSICIAN ASSISTANT

## 2025-04-08 PROCEDURE — G2211 COMPLEX E/M VISIT ADD ON: HCPCS | Performed by: PHYSICIAN ASSISTANT

## 2025-04-08 PROCEDURE — 85652 RBC SED RATE AUTOMATED: CPT | Performed by: PHYSICIAN ASSISTANT

## 2025-04-08 PROCEDURE — 86140 C-REACTIVE PROTEIN: CPT | Performed by: PHYSICIAN ASSISTANT

## 2025-04-08 PROCEDURE — 84460 ALANINE AMINO (ALT) (SGPT): CPT | Performed by: PHYSICIAN ASSISTANT

## 2025-04-08 PROCEDURE — 3078F DIAST BP <80 MM HG: CPT | Performed by: PHYSICIAN ASSISTANT

## 2025-04-08 PROCEDURE — 82565 ASSAY OF CREATININE: CPT | Performed by: PHYSICIAN ASSISTANT

## 2025-04-08 PROCEDURE — 3074F SYST BP LT 130 MM HG: CPT | Performed by: PHYSICIAN ASSISTANT

## 2025-04-08 PROCEDURE — 36415 COLL VENOUS BLD VENIPUNCTURE: CPT | Performed by: PHYSICIAN ASSISTANT

## 2025-04-08 PROCEDURE — 85027 COMPLETE CBC AUTOMATED: CPT | Performed by: PHYSICIAN ASSISTANT

## 2025-04-08 PROCEDURE — 99213 OFFICE O/P EST LOW 20 MIN: CPT | Performed by: PHYSICIAN ASSISTANT

## 2025-04-08 ASSESSMENT — PAIN SCALES - GENERAL: PAINLEVEL_OUTOF10: NO PAIN (0)

## 2025-04-08 NOTE — PATIENT INSTRUCTIONS
After Visit Instructions:     Thank you for coming to [facility] for your care. It is my goal to partner with you to help you reach your optimal state of health.       Plan:     Schedule follow-up with Drea Felix PA-C in 6 months.   Labs: CBC, creatinine, Albumin, AST, ALT, CRP and Sed Rate today and every 3 months  Medication recommendations:   Continue Sulfasalazine 500mg: Take  2 tabs twice daily. Take this medication with food.    Drea Felix PA-C  [facility]  [facility]    Contact information: [facility]  Clinic Number:  [phone]  Please call or send a [employer] message with any questions about your care

## 2025-04-28 ENCOUNTER — MYC REFILL (OUTPATIENT)
Dept: RHEUMATOLOGY | Facility: CLINIC | Age: 30
End: 2025-04-28
Payer: COMMERCIAL

## 2025-04-28 DIAGNOSIS — Z79.899 HIGH RISK MEDICATION USE: ICD-10-CM

## 2025-04-28 DIAGNOSIS — M19.90 INFLAMMATORY ARTHRITIS: ICD-10-CM

## 2025-04-28 DIAGNOSIS — R76.8 CYCLIC CITRULLINATED PEPTIDE (CCP) ANTIBODY POSITIVE: ICD-10-CM

## 2025-04-28 RX ORDER — SULFASALAZINE 500 MG/1
1000 TABLET, DELAYED RELEASE ORAL 2 TIMES DAILY
Qty: 360 TABLET | Refills: 0 | Status: SHIPPED | OUTPATIENT
Start: 2025-04-28

## 2025-04-28 NOTE — TELEPHONE ENCOUNTER
"Refill request for sulfasalazine ER 500m tabs BID  Last filled 24 for 90 day supply  Last visit with Isidro 25:  \"Plan:      Schedule follow-up with Drea Felix PA-C in 6 months.   Labs: CBC, creatinine, Albumin, AST, ALT, CRP and Sed Rate today and every 3 months  Medication recommendations:             Continue Sulfasalazine 500mg: Take  2 tabs twice daily. Take this medication with food.\"    Labs drawn 25 and resulted by Isidro:  \"Your monitoring labs are normal.  Please let me know if you have any questions. \"    90 day fill provided per the [facility] RN Protocol.    Brock GENTILE [facility]    "

## 2025-06-21 ENCOUNTER — HEALTH MAINTENANCE LETTER (OUTPATIENT)
Age: 30
End: 2025-06-21

## 2025-07-31 ENCOUNTER — LAB (OUTPATIENT)
Dept: LAB | Facility: CLINIC | Age: 30
End: 2025-07-31
Payer: COMMERCIAL

## 2025-07-31 DIAGNOSIS — M19.90 INFLAMMATORY ARTHRITIS: ICD-10-CM

## 2025-07-31 DIAGNOSIS — R76.8 CYCLIC CITRULLINATED PEPTIDE (CCP) ANTIBODY POSITIVE: ICD-10-CM

## 2025-07-31 DIAGNOSIS — Z79.899 HIGH RISK MEDICATION USE: ICD-10-CM

## 2025-07-31 LAB
ALBUMIN SERPL BCG-MCNC: 4.5 G/DL (ref 3.5–5.2)
ALT SERPL W P-5'-P-CCNC: 29 U/L (ref 0–70)
AST SERPL W P-5'-P-CCNC: 35 U/L (ref 0–45)
CREAT SERPL-MCNC: 0.9 MG/DL (ref 0.67–1.17)
CRP SERPL-MCNC: 13.6 MG/L
EGFRCR SERPLBLD CKD-EPI 2021: >90 ML/MIN/1.73M2
ERYTHROCYTE [DISTWIDTH] IN BLOOD BY AUTOMATED COUNT: 12.1 % (ref 10–15)
ERYTHROCYTE [SEDIMENTATION RATE] IN BLOOD BY WESTERGREN METHOD: 15 MM/HR (ref 0–15)
HCT VFR BLD AUTO: 41.9 % (ref 40–53)
HGB BLD-MCNC: 14 G/DL (ref 13.3–17.7)
MCH RBC QN AUTO: 32 PG (ref 26.5–33)
MCHC RBC AUTO-ENTMCNC: 33.4 G/DL (ref 31.5–36.5)
MCV RBC AUTO: 96 FL (ref 78–100)
PLATELET # BLD AUTO: 282 10E3/UL (ref 150–450)
RBC # BLD AUTO: 4.37 10E6/UL (ref 4.4–5.9)
WBC # BLD AUTO: 5.7 10E3/UL (ref 4–11)

## 2025-08-07 ENCOUNTER — TELEPHONE (OUTPATIENT)
Dept: RHEUMATOLOGY | Facility: CLINIC | Age: 30
End: 2025-08-07
Payer: COMMERCIAL